# Patient Record
Sex: FEMALE | Race: WHITE
[De-identification: names, ages, dates, MRNs, and addresses within clinical notes are randomized per-mention and may not be internally consistent; named-entity substitution may affect disease eponyms.]

---

## 2019-08-15 ENCOUNTER — HOSPITAL ENCOUNTER (OUTPATIENT)
Age: 79
End: 2019-08-15
Payer: MEDICARE

## 2019-08-15 DIAGNOSIS — M06.4: Primary | ICD-10-CM

## 2019-08-15 DIAGNOSIS — R76.8: ICD-10-CM

## 2019-08-15 DIAGNOSIS — M21.40: ICD-10-CM

## 2019-08-15 DIAGNOSIS — R51: ICD-10-CM

## 2019-08-15 DIAGNOSIS — E11.9: ICD-10-CM

## 2019-08-15 DIAGNOSIS — Z90.5: ICD-10-CM

## 2019-08-15 DIAGNOSIS — M17.0: ICD-10-CM

## 2019-08-15 DIAGNOSIS — I10: ICD-10-CM

## 2019-08-15 DIAGNOSIS — E78.5: ICD-10-CM

## 2019-08-15 LAB
ALANINE AMINOTRANSFER ALT/SGPT: 30 U/L (ref 13–56)
ALBUMIN SERPL-MCNC: 3.6 G/DL (ref 3.2–5)
ALKALINE PHOSPHATASE: 81 U/L (ref 45–117)
ANION GAP: 9 (ref 5–15)
AST(SGOT): 14 U/L (ref 15–37)
BUN SERPL-MCNC: 28 MG/DL (ref 7–18)
BUN/CREAT RATIO: 27.5 RATIO (ref 10–20)
CALCIUM SERPL-MCNC: 9.3 MG/DL (ref 8.5–10.1)
CARBON DIOXIDE: 30 MMOL/L (ref 21–32)
CHLORIDE: 102 MMOL/L (ref 98–107)
CREAT UR-MCNC: 165 MG/DL
DEPRECATED RDW RBC: 47 FL (ref 35.1–43.9)
ERYTHROCYTE [DISTWIDTH] IN BLOOD: 13.5 % (ref 11.6–14.6)
EST GLOM FILT RATE - AFR AMER: 68 ML/MIN (ref 60–?)
EXAGEN: (no result)
GLOBULIN: 3.7 G/DL (ref 2.2–4.2)
GLUCOSE: 139 MG/DL (ref 74–106)
HCT VFR BLD AUTO: 44.4 % (ref 37–47)
HEMOGLOBIN: 14.2 G/DL (ref 12–15)
HGB BLD-MCNC: 14.2 G/DL (ref 12–15)
IMMATURE GRANULOCYTES COUNT: 0.05 X10^3/UL (ref 0–0)
KETONE-DIPSTICK: 5 MG/DL
LEUKOCYTE ESTERASE UR QL STRIP: 25 /UL
MCV RBC: 94.7 FL (ref 81–99)
MEAN CORP HGB CONC: 32 G/DL (ref 32–36)
MEAN PLATELET VOL.: 10.4 FL (ref 6.2–12)
NRBC FLAGGED BY ANALYZER: 0 % (ref 0–5)
PLATELET # BLD: 280 K/MM3 (ref 150–450)
PLATELET COUNT: 280 K/MM3 (ref 150–450)
POTASSIUM: 3.8 MMOL/L (ref 3.5–5.1)
PROT UR QL STRIP.AUTO: 15 MG/DL
PROT UR-MCNC: 22.5 MG/DL (ref ?–11.9)
PROT/CREAT UR: 136 MG/G CRE (ref 0–200)
RBC # BLD AUTO: 4.69 M/MM3 (ref 4.2–5.4)
RBC DISTRIBUTION WIDTH CV: 13.5 % (ref 11.6–14.6)
RBC DISTRIBUTION WIDTH SD: 47 FL (ref 35.1–43.9)
SP GR UR: 1.02 (ref 1–1.03)
URINE PRESERVATIVE: (no result)
WBC # BLD AUTO: 12.5 K/MM3 (ref 4.4–11)
WHITE BLOOD COUNT: 12.5 K/MM3 (ref 4.4–11)

## 2019-08-15 PROCEDURE — 82570 ASSAY OF URINE CREATININE: CPT

## 2019-08-15 PROCEDURE — 84156 ASSAY OF PROTEIN URINE: CPT

## 2019-08-15 PROCEDURE — 87340 HEPATITIS B SURFACE AG IA: CPT

## 2019-08-15 PROCEDURE — 81002 URINALYSIS NONAUTO W/O SCOPE: CPT

## 2019-08-15 PROCEDURE — 86706 HEP B SURFACE ANTIBODY: CPT

## 2019-08-15 PROCEDURE — 86705 HEP B CORE ANTIBODY IGM: CPT

## 2019-08-15 PROCEDURE — 85025 COMPLETE CBC W/AUTO DIFF WBC: CPT

## 2019-08-15 PROCEDURE — 36415 COLL VENOUS BLD VENIPUNCTURE: CPT

## 2019-08-15 PROCEDURE — 86803 HEPATITIS C AB TEST: CPT

## 2019-08-15 PROCEDURE — 80053 COMPREHEN METABOLIC PANEL: CPT

## 2023-02-24 PROBLEM — E78.5 HYPERLIPIDEMIA: Status: ACTIVE | Noted: 2023-02-24

## 2023-02-24 PROBLEM — R39.15 URINARY URGENCY: Status: ACTIVE | Noted: 2023-02-24

## 2023-02-24 PROBLEM — I65.23 ATHEROSCLEROSIS OF BOTH CAROTID ARTERIES: Status: ACTIVE | Noted: 2023-02-24

## 2023-02-24 PROBLEM — E66.3 OVERWEIGHT WITH BODY MASS INDEX (BMI) OF 27 TO 27.9 IN ADULT: Status: ACTIVE | Noted: 2023-02-24

## 2023-02-24 PROBLEM — E66.8 EXTREME OBESITY: Status: ACTIVE | Noted: 2023-02-24

## 2023-02-24 PROBLEM — M77.8 TENDINITIS OF RIGHT SHOULDER: Status: ACTIVE | Noted: 2023-02-24

## 2023-02-24 PROBLEM — R35.0 URINARY FREQUENCY: Status: ACTIVE | Noted: 2023-02-24

## 2023-02-24 PROBLEM — R94.39 ABNORMAL STRESS TEST: Status: ACTIVE | Noted: 2023-02-24

## 2023-02-24 PROBLEM — I10 HYPERTENSION: Status: ACTIVE | Noted: 2023-02-24

## 2023-02-24 PROBLEM — L03.012 CELLULITIS OF LEFT RING FINGER: Status: ACTIVE | Noted: 2023-02-24

## 2023-02-24 PROBLEM — M10.9 GOUT: Status: ACTIVE | Noted: 2023-02-24

## 2023-02-24 PROBLEM — N30.90 BLADDER INFECTION: Status: ACTIVE | Noted: 2023-02-24

## 2023-02-24 PROBLEM — R53.83 FATIGUE: Status: ACTIVE | Noted: 2023-02-24

## 2023-02-24 PROBLEM — I25.810 CORONARY ARTERY DISEASE INVOLVING AUTOLOGOUS ARTERY CORONARY BYPASS GRAFT WITHOUT ANGINA PECTORIS: Status: ACTIVE | Noted: 2023-02-24

## 2023-02-24 PROBLEM — E53.8 VITAMIN B12 DEFICIENCY: Status: ACTIVE | Noted: 2023-02-24

## 2023-02-24 PROBLEM — F32.A DEPRESSION: Status: ACTIVE | Noted: 2023-02-24

## 2023-02-24 PROBLEM — E66.9 EXTREME OBESITY: Status: ACTIVE | Noted: 2023-02-24

## 2023-02-24 PROBLEM — M06.9 RHEUMATOID ARTHRITIS (MULTI): Status: ACTIVE | Noted: 2023-02-24

## 2023-02-24 PROBLEM — M17.11 OSTEOARTHRITIS OF RIGHT KNEE: Status: ACTIVE | Noted: 2023-02-24

## 2023-02-24 PROBLEM — R23.8 SKIN BREAKDOWN: Status: ACTIVE | Noted: 2023-02-24

## 2023-02-24 PROBLEM — R07.9 CHEST PAIN: Status: ACTIVE | Noted: 2023-02-24

## 2023-02-24 PROBLEM — M25.511 RIGHT SHOULDER PAIN: Status: ACTIVE | Noted: 2023-02-24

## 2023-02-24 PROBLEM — N39.41 URGE INCONTINENCE OF URINE: Status: ACTIVE | Noted: 2023-02-24

## 2023-02-24 PROBLEM — M54.50 LOW BACK PAIN: Status: ACTIVE | Noted: 2023-02-24

## 2023-02-24 PROBLEM — E11.9 DIABETES MELLITUS (MULTI): Status: ACTIVE | Noted: 2023-02-24

## 2023-02-24 PROBLEM — M06.9 ARTHRITIS OR POLYARTHRITIS, RHEUMATOID (MULTI): Status: ACTIVE | Noted: 2023-02-24

## 2023-02-24 PROBLEM — R60.0 BILATERAL LEG EDEMA: Status: ACTIVE | Noted: 2023-02-24

## 2023-02-24 RX ORDER — FOLIC ACID 1 MG/1
1 TABLET ORAL 2 TIMES DAILY
COMMUNITY
End: 2023-04-24 | Stop reason: ALTCHOICE

## 2023-02-24 RX ORDER — METFORMIN HYDROCHLORIDE 750 MG/1
2 TABLET, EXTENDED RELEASE ORAL DAILY
COMMUNITY
End: 2023-11-07 | Stop reason: SDUPTHER

## 2023-02-24 RX ORDER — BLOOD SUGAR DIAGNOSTIC
STRIP MISCELLANEOUS
COMMUNITY
Start: 2020-05-22

## 2023-02-24 RX ORDER — MIRABEGRON 25 MG/1
1 TABLET, FILM COATED, EXTENDED RELEASE ORAL DAILY
COMMUNITY
Start: 2022-10-06 | End: 2023-11-07 | Stop reason: SDUPTHER

## 2023-02-24 RX ORDER — LISINOPRIL 40 MG/1
1 TABLET ORAL DAILY
COMMUNITY
End: 2023-11-07 | Stop reason: SDUPTHER

## 2023-02-24 RX ORDER — SERTRALINE HYDROCHLORIDE 50 MG/1
TABLET, FILM COATED ORAL
COMMUNITY
Start: 2022-11-03 | End: 2023-05-25 | Stop reason: SDUPTHER

## 2023-02-24 RX ORDER — LANCETS
EACH MISCELLANEOUS
COMMUNITY

## 2023-02-24 RX ORDER — ATORVASTATIN CALCIUM 20 MG/1
1 TABLET, FILM COATED ORAL DAILY
COMMUNITY
End: 2023-11-07 | Stop reason: SDUPTHER

## 2023-02-24 RX ORDER — FUROSEMIDE 40 MG/1
2 TABLET ORAL DAILY
COMMUNITY
End: 2023-11-07 | Stop reason: SDUPTHER

## 2023-02-24 RX ORDER — LABETALOL 200 MG/1
1 TABLET, FILM COATED ORAL 2 TIMES DAILY
COMMUNITY
End: 2023-11-07 | Stop reason: SDUPTHER

## 2023-03-21 ENCOUNTER — CLINICAL SUPPORT (OUTPATIENT)
Dept: PRIMARY CARE | Facility: CLINIC | Age: 83
End: 2023-03-21
Payer: MEDICARE

## 2023-03-21 DIAGNOSIS — E53.8 VITAMIN B12 DEFICIENCY: ICD-10-CM

## 2023-03-21 PROCEDURE — 96372 THER/PROPH/DIAG INJ SC/IM: CPT | Performed by: FAMILY MEDICINE

## 2023-03-21 RX ORDER — CYANOCOBALAMIN 1000 UG/ML
1000 INJECTION, SOLUTION INTRAMUSCULAR; SUBCUTANEOUS ONCE
Status: COMPLETED | OUTPATIENT
Start: 2023-03-21 | End: 2023-03-21

## 2023-03-21 RX ADMIN — CYANOCOBALAMIN 1000 MCG: 1000 INJECTION, SOLUTION INTRAMUSCULAR; SUBCUTANEOUS at 15:07

## 2023-04-17 LAB
ALANINE AMINOTRANSFERASE (SGPT) (U/L) IN SER/PLAS: 12 U/L (ref 7–45)
ALBUMIN (G/DL) IN SER/PLAS: 4.1 G/DL (ref 3.4–5)
ALKALINE PHOSPHATASE (U/L) IN SER/PLAS: 95 U/L (ref 33–136)
ANION GAP IN SER/PLAS: 11 MMOL/L (ref 10–20)
ASPARTATE AMINOTRANSFERASE (SGOT) (U/L) IN SER/PLAS: 17 U/L (ref 9–39)
BASOPHILS (10*3/UL) IN BLOOD BY AUTOMATED COUNT: 0.05 X10E9/L (ref 0–0.1)
BASOPHILS/100 LEUKOCYTES IN BLOOD BY AUTOMATED COUNT: 0.6 % (ref 0–2)
BILIRUBIN TOTAL (MG/DL) IN SER/PLAS: 0.5 MG/DL (ref 0–1.2)
CALCIUM (MG/DL) IN SER/PLAS: 9.7 MG/DL (ref 8.6–10.3)
CARBON DIOXIDE, TOTAL (MMOL/L) IN SER/PLAS: 30 MMOL/L (ref 21–32)
CHLORIDE (MMOL/L) IN SER/PLAS: 105 MMOL/L (ref 98–107)
CHOLESTEROL (MG/DL) IN SER/PLAS: 149 MG/DL (ref 0–199)
CHOLESTEROL IN HDL (MG/DL) IN SER/PLAS: 46 MG/DL
CHOLESTEROL/HDL RATIO: 3.2
COBALAMIN (VITAMIN B12) (PG/ML) IN SER/PLAS: 400 PG/ML (ref 211–911)
CREATININE (MG/DL) IN SER/PLAS: 0.88 MG/DL (ref 0.5–1.05)
EOSINOPHILS (10*3/UL) IN BLOOD BY AUTOMATED COUNT: 0.27 X10E9/L (ref 0–0.4)
EOSINOPHILS/100 LEUKOCYTES IN BLOOD BY AUTOMATED COUNT: 3.5 % (ref 0–6)
ERYTHROCYTE DISTRIBUTION WIDTH (RATIO) BY AUTOMATED COUNT: 13.2 % (ref 11.5–14.5)
ERYTHROCYTE MEAN CORPUSCULAR HEMOGLOBIN CONCENTRATION (G/DL) BY AUTOMATED: 31.2 G/DL (ref 32–36)
ERYTHROCYTE MEAN CORPUSCULAR VOLUME (FL) BY AUTOMATED COUNT: 93 FL (ref 80–100)
ERYTHROCYTES (10*6/UL) IN BLOOD BY AUTOMATED COUNT: 4.27 X10E12/L (ref 4–5.2)
ESTIMATED AVERAGE GLUCOSE FOR HBA1C: 134 MG/DL
GFR FEMALE: 66 ML/MIN/1.73M2
GLUCOSE (MG/DL) IN SER/PLAS: 102 MG/DL (ref 74–99)
HEMATOCRIT (%) IN BLOOD BY AUTOMATED COUNT: 39.7 % (ref 36–46)
HEMOGLOBIN (G/DL) IN BLOOD: 12.4 G/DL (ref 12–16)
HEMOGLOBIN A1C/HEMOGLOBIN TOTAL IN BLOOD: 6.3 %
IMMATURE GRANULOCYTES/100 LEUKOCYTES IN BLOOD BY AUTOMATED COUNT: 0.3 % (ref 0–0.9)
LDL: 83 MG/DL (ref 0–99)
LEUKOCYTES (10*3/UL) IN BLOOD BY AUTOMATED COUNT: 7.7 X10E9/L (ref 4.4–11.3)
LYMPHOCYTES (10*3/UL) IN BLOOD BY AUTOMATED COUNT: 1.35 X10E9/L (ref 0.8–3)
LYMPHOCYTES/100 LEUKOCYTES IN BLOOD BY AUTOMATED COUNT: 17.5 % (ref 13–44)
MONOCYTES (10*3/UL) IN BLOOD BY AUTOMATED COUNT: 0.32 X10E9/L (ref 0.05–0.8)
MONOCYTES/100 LEUKOCYTES IN BLOOD BY AUTOMATED COUNT: 4.2 % (ref 2–10)
NEUTROPHILS (10*3/UL) IN BLOOD BY AUTOMATED COUNT: 5.7 X10E9/L (ref 1.6–5.5)
NEUTROPHILS/100 LEUKOCYTES IN BLOOD BY AUTOMATED COUNT: 73.9 % (ref 40–80)
PLATELETS (10*3/UL) IN BLOOD AUTOMATED COUNT: 290 X10E9/L (ref 150–450)
POTASSIUM (MMOL/L) IN SER/PLAS: 4.2 MMOL/L (ref 3.5–5.3)
PROTEIN TOTAL: 6.8 G/DL (ref 6.4–8.2)
SODIUM (MMOL/L) IN SER/PLAS: 142 MMOL/L (ref 136–145)
TRIGLYCERIDE (MG/DL) IN SER/PLAS: 101 MG/DL (ref 0–149)
UREA NITROGEN (MG/DL) IN SER/PLAS: 23 MG/DL (ref 6–23)
VLDL: 20 MG/DL (ref 0–40)

## 2023-04-24 ENCOUNTER — OFFICE VISIT (OUTPATIENT)
Dept: PRIMARY CARE | Facility: CLINIC | Age: 83
End: 2023-04-24
Payer: MEDICARE

## 2023-04-24 ENCOUNTER — TELEPHONE (OUTPATIENT)
Dept: PRIMARY CARE | Facility: CLINIC | Age: 83
End: 2023-04-24

## 2023-04-24 VITALS
OXYGEN SATURATION: 98 % | HEIGHT: 62 IN | BODY MASS INDEX: 26.59 KG/M2 | WEIGHT: 144.5 LBS | SYSTOLIC BLOOD PRESSURE: 106 MMHG | DIASTOLIC BLOOD PRESSURE: 64 MMHG | HEART RATE: 60 BPM

## 2023-04-24 DIAGNOSIS — I25.810 CORONARY ARTERY DISEASE INVOLVING AUTOLOGOUS ARTERY CORONARY BYPASS GRAFT WITHOUT ANGINA PECTORIS: Primary | ICD-10-CM

## 2023-04-24 DIAGNOSIS — M06.9 RHEUMATOID ARTHRITIS, INVOLVING UNSPECIFIED SITE, UNSPECIFIED WHETHER RHEUMATOID FACTOR PRESENT (MULTI): ICD-10-CM

## 2023-04-24 DIAGNOSIS — E11.9 TYPE 2 DIABETES MELLITUS WITHOUT COMPLICATION, WITHOUT LONG-TERM CURRENT USE OF INSULIN (MULTI): ICD-10-CM

## 2023-04-24 DIAGNOSIS — R00.2 PALPITATIONS: ICD-10-CM

## 2023-04-24 DIAGNOSIS — E53.8 VITAMIN B12 DEFICIENCY: ICD-10-CM

## 2023-04-24 DIAGNOSIS — I10 PRIMARY HYPERTENSION: ICD-10-CM

## 2023-04-24 DIAGNOSIS — E78.2 MIXED HYPERLIPIDEMIA: ICD-10-CM

## 2023-04-24 DIAGNOSIS — F33.0 MILD EPISODE OF RECURRENT MAJOR DEPRESSIVE DISORDER (CMS-HCC): ICD-10-CM

## 2023-04-24 PROBLEM — E66.8 EXTREME OBESITY: Status: RESOLVED | Noted: 2023-02-24 | Resolved: 2023-04-24

## 2023-04-24 PROBLEM — E66.3 OVERWEIGHT WITH BODY MASS INDEX (BMI) OF 27 TO 27.9 IN ADULT: Status: RESOLVED | Noted: 2023-02-24 | Resolved: 2023-04-24

## 2023-04-24 PROBLEM — R07.9 CHEST PAIN: Status: RESOLVED | Noted: 2023-02-24 | Resolved: 2023-04-24

## 2023-04-24 PROBLEM — R39.15 URINARY URGENCY: Status: RESOLVED | Noted: 2023-02-24 | Resolved: 2023-04-24

## 2023-04-24 PROBLEM — E66.9 EXTREME OBESITY: Status: RESOLVED | Noted: 2023-02-24 | Resolved: 2023-04-24

## 2023-04-24 PROBLEM — R35.0 URINARY FREQUENCY: Status: RESOLVED | Noted: 2023-02-24 | Resolved: 2023-04-24

## 2023-04-24 PROCEDURE — 96372 THER/PROPH/DIAG INJ SC/IM: CPT | Performed by: FAMILY MEDICINE

## 2023-04-24 PROCEDURE — 1157F ADVNC CARE PLAN IN RCRD: CPT | Performed by: FAMILY MEDICINE

## 2023-04-24 PROCEDURE — 3074F SYST BP LT 130 MM HG: CPT | Performed by: FAMILY MEDICINE

## 2023-04-24 PROCEDURE — 1160F RVW MEDS BY RX/DR IN RCRD: CPT | Performed by: FAMILY MEDICINE

## 2023-04-24 PROCEDURE — 1036F TOBACCO NON-USER: CPT | Performed by: FAMILY MEDICINE

## 2023-04-24 PROCEDURE — 99214 OFFICE O/P EST MOD 30 MIN: CPT | Performed by: FAMILY MEDICINE

## 2023-04-24 PROCEDURE — 1159F MED LIST DOCD IN RCRD: CPT | Performed by: FAMILY MEDICINE

## 2023-04-24 PROCEDURE — 3078F DIAST BP <80 MM HG: CPT | Performed by: FAMILY MEDICINE

## 2023-04-24 RX ORDER — CYANOCOBALAMIN 1000 UG/ML
1000 INJECTION, SOLUTION INTRAMUSCULAR; SUBCUTANEOUS ONCE
Status: COMPLETED | OUTPATIENT
Start: 2023-04-24 | End: 2023-04-24

## 2023-04-24 RX ADMIN — CYANOCOBALAMIN 1000 MCG: 1000 INJECTION, SOLUTION INTRAMUSCULAR; SUBCUTANEOUS at 16:01

## 2023-04-24 ASSESSMENT — ENCOUNTER SYMPTOMS
ACTIVITY CHANGE: 0
WHEEZING: 0
RHINORRHEA: 0
SHORTNESS OF BREATH: 0
ABDOMINAL PAIN: 0
NUMBNESS: 0
ABDOMINAL DISTENTION: 0
PALPITATIONS: 1
DIFFICULTY URINATING: 0
DIZZINESS: 0
COUGH: 0
HEADACHES: 0
CONSTIPATION: 0
ARTHRALGIAS: 0
LIGHT-HEADEDNESS: 0
TROUBLE SWALLOWING: 0
FATIGUE: 0
NAUSEA: 0
DIARRHEA: 0
APPETITE CHANGE: 0
UNEXPECTED WEIGHT CHANGE: 0
NERVOUS/ANXIOUS: 1
ADENOPATHY: 0
VOMITING: 0

## 2023-04-24 NOTE — ASSESSMENT & PLAN NOTE
Seen cardiologist in February, no change, blood pressure seems to be stable today.  Patient is having some intermittent chest pressure and palpitations at times, will check a 7-day event monitor.

## 2023-04-24 NOTE — ASSESSMENT & PLAN NOTE
Blood pressure in the office today is good, some home blood pressures are elevated, renal function is improved, no change.

## 2023-04-24 NOTE — PROGRESS NOTES
"Subjective   Patient ID: Alma Delia Walsh is a 82 y.o. female who presents for 3 MO F/U HTN REV LABS (B12 INJ).    HPI   No headache, chest pain, shortness of breath, dizziness, lightheadedness, or edema  No low blood sugars since last OV, seen opthalmology in the past year, and no numbness or tingling in feet, skin normal.  Some chest pressure at times, seen cardiology in February  Some pain in right arm, no HHC at home, family helps  Stomach and bowel issues some better  Medicine helps bladder issues    Review of Systems   Constitutional:  Negative for activity change, appetite change, fatigue and unexpected weight change.   HENT:  Negative for ear pain, nosebleeds, rhinorrhea, sneezing and trouble swallowing.    Respiratory:  Negative for cough, shortness of breath and wheezing.    Cardiovascular:  Positive for chest pain and palpitations. Negative for leg swelling.   Gastrointestinal:  Negative for abdominal distention, abdominal pain, constipation, diarrhea, nausea and vomiting.   Genitourinary:  Negative for difficulty urinating.   Musculoskeletal:  Negative for arthralgias.   Skin:  Negative for rash.   Neurological:  Negative for dizziness, light-headedness, numbness and headaches.   Hematological:  Negative for adenopathy.   Psychiatric/Behavioral:  Negative for behavioral problems. The patient is nervous/anxious.    All other systems reviewed and are negative.      Objective   /64   Pulse 60   Ht 1.575 m (5' 2\")   Wt 65.5 kg (144 lb 8 oz)   SpO2 98%   BMI 26.43 kg/m²     Physical Exam  Vitals and nursing note reviewed.   Constitutional:       Appearance: Normal appearance.   Cardiovascular:      Rate and Rhythm: Normal rate and regular rhythm.      Pulses: Normal pulses.      Heart sounds: Normal heart sounds.   Pulmonary:      Effort: Pulmonary effort is normal.      Breath sounds: Normal breath sounds.   Neurological:      Mental Status: She is alert.   Psychiatric:         Mood and Affect: " Mood normal.         Behavior: Behavior normal.         Assessment/Plan   Problem List Items Addressed This Visit          Circulatory    Coronary artery disease involving autologous artery coronary bypass graft without angina pectoris - Primary     Seen cardiologist in February, no change, blood pressure seems to be stable today.  Patient is having some intermittent chest pressure and palpitations at times, will check a 7-day event monitor.         Relevant Orders    Comprehensive Metabolic Panel    Holter or Event Cardiac Monitor    Follow Up In Primary Care    Hypertension     Blood pressure in the office today is good, some home blood pressures are elevated, renal function is improved, no change.         Relevant Orders    Comprehensive Metabolic Panel    Follow Up In Primary Care    Palpitations     Patient complaining of some intermittent palpitations over the past month, some worse at night, just checking 7-day event monitor to rule out atrial fibrillation.         Relevant Orders    Holter or Event Cardiac Monitor    Follow Up In Primary Care       Endocrine/Metabolic    Diabetes mellitus (CMS/HCC)     A1c testing now below 6.5, tolerating release metformin with less diarrhea, advised about diet         Relevant Orders    Comprehensive Metabolic Panel    Hemoglobin A1C    Follow Up In Primary Care    Vitamin B12 deficiency     B12 levels approaching more normal range, continue with monthly injections.         Relevant Orders    CBC    Vitamin B12    Follow Up In Primary Care       Other    Depression     To be stable at this point.  Family is helpful at home.         Relevant Orders    Follow Up In Primary Care    Hyperlipidemia    Relevant Orders    Comprehensive Metabolic Panel    Follow Up In Primary Care    Arthritis or polyarthritis, rheumatoid (CMS/HCC)     To be stable, patient has no follow-up with rheumatology.         Relevant Orders    Follow Up In Primary Care

## 2023-04-24 NOTE — ASSESSMENT & PLAN NOTE
Patient complaining of some intermittent palpitations over the past month, some worse at night, just checking 7-day event monitor to rule out atrial fibrillation.

## 2023-05-11 ENCOUNTER — TELEPHONE (OUTPATIENT)
Dept: PRIMARY CARE | Facility: CLINIC | Age: 83
End: 2023-05-11
Payer: MEDICARE

## 2023-05-25 ENCOUNTER — APPOINTMENT (OUTPATIENT)
Dept: PRIMARY CARE | Facility: CLINIC | Age: 83
End: 2023-05-25
Payer: MEDICARE

## 2023-05-25 ENCOUNTER — OFFICE VISIT (OUTPATIENT)
Dept: PRIMARY CARE | Facility: CLINIC | Age: 83
End: 2023-05-25
Payer: MEDICARE

## 2023-05-25 VITALS
OXYGEN SATURATION: 98 % | BODY MASS INDEX: 26.83 KG/M2 | SYSTOLIC BLOOD PRESSURE: 150 MMHG | HEIGHT: 62 IN | HEART RATE: 51 BPM | WEIGHT: 145.8 LBS | DIASTOLIC BLOOD PRESSURE: 70 MMHG

## 2023-05-25 DIAGNOSIS — F33.0 MILD EPISODE OF RECURRENT MAJOR DEPRESSIVE DISORDER (CMS-HCC): Primary | ICD-10-CM

## 2023-05-25 DIAGNOSIS — E53.8 VITAMIN B12 DEFICIENCY: ICD-10-CM

## 2023-05-25 PROCEDURE — 3078F DIAST BP <80 MM HG: CPT | Performed by: FAMILY MEDICINE

## 2023-05-25 PROCEDURE — 1157F ADVNC CARE PLAN IN RCRD: CPT | Performed by: FAMILY MEDICINE

## 2023-05-25 PROCEDURE — 1036F TOBACCO NON-USER: CPT | Performed by: FAMILY MEDICINE

## 2023-05-25 PROCEDURE — 3077F SYST BP >= 140 MM HG: CPT | Performed by: FAMILY MEDICINE

## 2023-05-25 PROCEDURE — 1160F RVW MEDS BY RX/DR IN RCRD: CPT | Performed by: FAMILY MEDICINE

## 2023-05-25 PROCEDURE — 96372 THER/PROPH/DIAG INJ SC/IM: CPT | Performed by: FAMILY MEDICINE

## 2023-05-25 PROCEDURE — 1159F MED LIST DOCD IN RCRD: CPT | Performed by: FAMILY MEDICINE

## 2023-05-25 PROCEDURE — 99213 OFFICE O/P EST LOW 20 MIN: CPT | Performed by: FAMILY MEDICINE

## 2023-05-25 RX ORDER — CYANOCOBALAMIN 1000 UG/ML
1000 INJECTION, SOLUTION INTRAMUSCULAR; SUBCUTANEOUS ONCE
Status: COMPLETED | OUTPATIENT
Start: 2023-05-25 | End: 2023-05-25

## 2023-05-25 RX ORDER — SERTRALINE HYDROCHLORIDE 100 MG/1
100 TABLET, FILM COATED ORAL DAILY
Qty: 30 TABLET | Refills: 11 | Status: SHIPPED | OUTPATIENT
Start: 2023-05-25 | End: 2023-08-07 | Stop reason: SDUPTHER

## 2023-05-25 RX ADMIN — CYANOCOBALAMIN 1000 MCG: 1000 INJECTION, SOLUTION INTRAMUSCULAR; SUBCUTANEOUS at 15:03

## 2023-05-25 ASSESSMENT — ENCOUNTER SYMPTOMS
APPETITE CHANGE: 1
CONSTIPATION: 0
SHORTNESS OF BREATH: 0
NAUSEA: 0
DYSPHORIC MOOD: 1
DECREASED CONCENTRATION: 1
ABDOMINAL PAIN: 0
HALLUCINATIONS: 0
NERVOUS/ANXIOUS: 1
VOMITING: 0
SLEEP DISTURBANCE: 1
FATIGUE: 1
DIARRHEA: 0
PALPITATIONS: 0
AGITATION: 0

## 2023-05-25 NOTE — PROGRESS NOTES
"Subjective   Patient ID: Alma Delia Walsh is a 82 y.o. female who presents for Anxiety.    HPI   Easily tearful, seen cardiology (negative evaluation, concerned about anxiety attacks)  Worry a lot, waking up at night with pressure in chest/heavy feeling, appetite +/- lost 5 pounds in the past 6 months, no toilet issues, hard to relax at times, no SI/HI  Some trouble with focus and concentration, easily irritable and angry      Review of Systems   Constitutional:  Positive for appetite change and fatigue.   Respiratory:  Negative for shortness of breath.    Cardiovascular:  Negative for chest pain, palpitations and leg swelling.   Gastrointestinal:  Negative for abdominal pain, constipation, diarrhea, nausea and vomiting.   Psychiatric/Behavioral:  Positive for behavioral problems, decreased concentration, dysphoric mood and sleep disturbance. Negative for agitation, hallucinations and suicidal ideas. The patient is nervous/anxious.        Objective   /70   Pulse 51   Ht 1.575 m (5' 2\")   Wt 66.1 kg (145 lb 12.8 oz)   SpO2 98%   BMI 26.67 kg/m²     Physical Exam  Vitals and nursing note reviewed.   Constitutional:       Appearance: Normal appearance.   Cardiovascular:      Rate and Rhythm: Normal rate and regular rhythm.      Pulses: Normal pulses.      Heart sounds: Normal heart sounds.   Pulmonary:      Effort: Pulmonary effort is normal.      Breath sounds: Normal breath sounds.   Neurological:      Mental Status: She is alert.   Psychiatric:         Mood and Affect: Mood normal.         Behavior: Behavior normal.         Assessment/Plan   Problem List Items Addressed This Visit          Endocrine/Metabolic    Vitamin B12 deficiency    Relevant Medications    cyanocobalamin (Vitamin B-12) injection 1,000 mcg (Completed) (Start on 5/25/2023  3:15 PM)       Other    Depression - Primary    Relevant Medications    sertraline (Zoloft) 100 mg tablet    Other Relevant Orders    Follow Up In Primary Care        "

## 2023-05-25 NOTE — ASSESSMENT & PLAN NOTE
Patient with some component of anxiety with her depression, try increasing sertraline to 100 mg a day, this medicine did help when we first started it several months ago.  If no help consider adding mirtazapine to the patient's regimen, will recheck again in 1 month.  Patient may also want to consider short-term counseling.

## 2023-06-22 ENCOUNTER — CLINICAL SUPPORT (OUTPATIENT)
Dept: PRIMARY CARE | Facility: CLINIC | Age: 83
End: 2023-06-22
Payer: MEDICARE

## 2023-06-22 DIAGNOSIS — E53.8 VITAMIN B12 DEFICIENCY: ICD-10-CM

## 2023-06-22 PROCEDURE — 96372 THER/PROPH/DIAG INJ SC/IM: CPT | Performed by: NURSE PRACTITIONER

## 2023-06-22 RX ORDER — CYANOCOBALAMIN 1000 UG/ML
1000 INJECTION, SOLUTION INTRAMUSCULAR; SUBCUTANEOUS ONCE
Status: COMPLETED | OUTPATIENT
Start: 2023-06-22 | End: 2023-06-22

## 2023-06-22 RX ADMIN — CYANOCOBALAMIN 1000 MCG: 1000 INJECTION, SOLUTION INTRAMUSCULAR; SUBCUTANEOUS at 15:00

## 2023-07-10 ENCOUNTER — APPOINTMENT (OUTPATIENT)
Dept: PRIMARY CARE | Facility: CLINIC | Age: 83
End: 2023-07-10
Payer: MEDICARE

## 2023-07-17 ENCOUNTER — APPOINTMENT (OUTPATIENT)
Dept: PRIMARY CARE | Facility: CLINIC | Age: 83
End: 2023-07-17
Payer: MEDICARE

## 2023-07-24 ENCOUNTER — APPOINTMENT (OUTPATIENT)
Dept: PRIMARY CARE | Facility: CLINIC | Age: 83
End: 2023-07-24
Payer: MEDICARE

## 2023-07-31 ENCOUNTER — LAB (OUTPATIENT)
Dept: LAB | Facility: LAB | Age: 83
End: 2023-07-31
Payer: MEDICARE

## 2023-07-31 DIAGNOSIS — E53.8 VITAMIN B12 DEFICIENCY: ICD-10-CM

## 2023-07-31 DIAGNOSIS — I25.810 CORONARY ARTERY DISEASE INVOLVING AUTOLOGOUS ARTERY CORONARY BYPASS GRAFT WITHOUT ANGINA PECTORIS: ICD-10-CM

## 2023-07-31 DIAGNOSIS — I10 PRIMARY HYPERTENSION: ICD-10-CM

## 2023-07-31 DIAGNOSIS — E78.2 MIXED HYPERLIPIDEMIA: ICD-10-CM

## 2023-07-31 DIAGNOSIS — E11.9 TYPE 2 DIABETES MELLITUS WITHOUT COMPLICATION, WITHOUT LONG-TERM CURRENT USE OF INSULIN (MULTI): ICD-10-CM

## 2023-07-31 LAB
ALANINE AMINOTRANSFERASE (SGPT) (U/L) IN SER/PLAS: 15 U/L (ref 7–45)
ALBUMIN (G/DL) IN SER/PLAS: 4.1 G/DL (ref 3.4–5)
ALKALINE PHOSPHATASE (U/L) IN SER/PLAS: 89 U/L (ref 33–136)
ANION GAP IN SER/PLAS: 10 MMOL/L (ref 10–20)
ASPARTATE AMINOTRANSFERASE (SGOT) (U/L) IN SER/PLAS: 18 U/L (ref 9–39)
BILIRUBIN TOTAL (MG/DL) IN SER/PLAS: 0.5 MG/DL (ref 0–1.2)
CALCIUM (MG/DL) IN SER/PLAS: 9.6 MG/DL (ref 8.6–10.3)
CARBON DIOXIDE, TOTAL (MMOL/L) IN SER/PLAS: 32 MMOL/L (ref 21–32)
CHLORIDE (MMOL/L) IN SER/PLAS: 104 MMOL/L (ref 98–107)
COBALAMIN (VITAMIN B12) (PG/ML) IN SER/PLAS: 520 PG/ML (ref 211–911)
CREATININE (MG/DL) IN SER/PLAS: 0.91 MG/DL (ref 0.5–1.05)
ERYTHROCYTE DISTRIBUTION WIDTH (RATIO) BY AUTOMATED COUNT: 13.5 % (ref 11.5–14.5)
ERYTHROCYTE MEAN CORPUSCULAR HEMOGLOBIN CONCENTRATION (G/DL) BY AUTOMATED: 31.1 G/DL (ref 32–36)
ERYTHROCYTE MEAN CORPUSCULAR VOLUME (FL) BY AUTOMATED COUNT: 96 FL (ref 80–100)
ERYTHROCYTES (10*6/UL) IN BLOOD BY AUTOMATED COUNT: 4.01 X10E12/L (ref 4–5.2)
ESTIMATED AVERAGE GLUCOSE FOR HBA1C: 131 MG/DL
GFR FEMALE: 63 ML/MIN/1.73M2
GLUCOSE (MG/DL) IN SER/PLAS: 95 MG/DL (ref 74–99)
HEMATOCRIT (%) IN BLOOD BY AUTOMATED COUNT: 38.3 % (ref 36–46)
HEMOGLOBIN (G/DL) IN BLOOD: 11.9 G/DL (ref 12–16)
HEMOGLOBIN A1C/HEMOGLOBIN TOTAL IN BLOOD: 6.2 %
LEUKOCYTES (10*3/UL) IN BLOOD BY AUTOMATED COUNT: 7.6 X10E9/L (ref 4.4–11.3)
PLATELETS (10*3/UL) IN BLOOD AUTOMATED COUNT: 253 X10E9/L (ref 150–450)
POTASSIUM (MMOL/L) IN SER/PLAS: 4.2 MMOL/L (ref 3.5–5.3)
PROTEIN TOTAL: 6.6 G/DL (ref 6.4–8.2)
SODIUM (MMOL/L) IN SER/PLAS: 142 MMOL/L (ref 136–145)
UREA NITROGEN (MG/DL) IN SER/PLAS: 27 MG/DL (ref 6–23)

## 2023-07-31 PROCEDURE — 36415 COLL VENOUS BLD VENIPUNCTURE: CPT

## 2023-07-31 PROCEDURE — 83036 HEMOGLOBIN GLYCOSYLATED A1C: CPT

## 2023-07-31 PROCEDURE — 85027 COMPLETE CBC AUTOMATED: CPT

## 2023-07-31 PROCEDURE — 80053 COMPREHEN METABOLIC PANEL: CPT

## 2023-07-31 PROCEDURE — 82607 VITAMIN B-12: CPT

## 2023-08-07 ENCOUNTER — APPOINTMENT (OUTPATIENT)
Dept: PRIMARY CARE | Facility: CLINIC | Age: 83
End: 2023-08-07
Payer: MEDICARE

## 2023-08-07 ENCOUNTER — OFFICE VISIT (OUTPATIENT)
Dept: PRIMARY CARE | Facility: CLINIC | Age: 83
End: 2023-08-07
Payer: MEDICARE

## 2023-08-07 VITALS
HEART RATE: 46 BPM | HEIGHT: 62 IN | SYSTOLIC BLOOD PRESSURE: 140 MMHG | OXYGEN SATURATION: 97 % | WEIGHT: 146.1 LBS | BODY MASS INDEX: 26.89 KG/M2 | DIASTOLIC BLOOD PRESSURE: 60 MMHG

## 2023-08-07 DIAGNOSIS — E11.9 TYPE 2 DIABETES MELLITUS WITHOUT COMPLICATION, WITHOUT LONG-TERM CURRENT USE OF INSULIN (MULTI): Primary | ICD-10-CM

## 2023-08-07 DIAGNOSIS — E78.2 MIXED HYPERLIPIDEMIA: ICD-10-CM

## 2023-08-07 DIAGNOSIS — F33.0 MILD EPISODE OF RECURRENT MAJOR DEPRESSIVE DISORDER (CMS-HCC): ICD-10-CM

## 2023-08-07 DIAGNOSIS — I25.810 CORONARY ARTERY DISEASE INVOLVING AUTOLOGOUS ARTERY CORONARY BYPASS GRAFT WITHOUT ANGINA PECTORIS: ICD-10-CM

## 2023-08-07 DIAGNOSIS — M06.9 RHEUMATOID ARTHRITIS, INVOLVING UNSPECIFIED SITE, UNSPECIFIED WHETHER RHEUMATOID FACTOR PRESENT (MULTI): ICD-10-CM

## 2023-08-07 DIAGNOSIS — I10 PRIMARY HYPERTENSION: ICD-10-CM

## 2023-08-07 DIAGNOSIS — R00.2 PALPITATIONS: ICD-10-CM

## 2023-08-07 DIAGNOSIS — E53.8 VITAMIN B12 DEFICIENCY: ICD-10-CM

## 2023-08-07 PROBLEM — L03.012 CELLULITIS OF LEFT RING FINGER: Status: RESOLVED | Noted: 2023-02-24 | Resolved: 2023-08-07

## 2023-08-07 PROBLEM — N30.90 BLADDER INFECTION: Status: RESOLVED | Noted: 2023-02-24 | Resolved: 2023-08-07

## 2023-08-07 PROBLEM — N39.41 URGE INCONTINENCE OF URINE: Status: RESOLVED | Noted: 2023-02-24 | Resolved: 2023-08-07

## 2023-08-07 PROCEDURE — 99213 OFFICE O/P EST LOW 20 MIN: CPT | Performed by: FAMILY MEDICINE

## 2023-08-07 PROCEDURE — 3078F DIAST BP <80 MM HG: CPT | Performed by: FAMILY MEDICINE

## 2023-08-07 PROCEDURE — 1159F MED LIST DOCD IN RCRD: CPT | Performed by: FAMILY MEDICINE

## 2023-08-07 PROCEDURE — 1036F TOBACCO NON-USER: CPT | Performed by: FAMILY MEDICINE

## 2023-08-07 PROCEDURE — 3077F SYST BP >= 140 MM HG: CPT | Performed by: FAMILY MEDICINE

## 2023-08-07 PROCEDURE — 1157F ADVNC CARE PLAN IN RCRD: CPT | Performed by: FAMILY MEDICINE

## 2023-08-07 PROCEDURE — 1160F RVW MEDS BY RX/DR IN RCRD: CPT | Performed by: FAMILY MEDICINE

## 2023-08-07 RX ORDER — MELOXICAM 7.5 MG/1
7.5 TABLET ORAL DAILY
Qty: 30 TABLET | Refills: 11 | Status: SHIPPED | OUTPATIENT
Start: 2023-08-07 | End: 2024-05-21 | Stop reason: WASHOUT

## 2023-08-07 RX ORDER — SERTRALINE HYDROCHLORIDE 100 MG/1
100 TABLET, FILM COATED ORAL DAILY
Qty: 30 TABLET | Refills: 11 | Status: SHIPPED | OUTPATIENT
Start: 2023-08-07 | End: 2024-05-21 | Stop reason: SDUPTHER

## 2023-08-07 ASSESSMENT — ENCOUNTER SYMPTOMS
COUGH: 0
TROUBLE SWALLOWING: 0
HEADACHES: 0
FATIGUE: 1
LIGHT-HEADEDNESS: 0
APPETITE CHANGE: 0
NUMBNESS: 0
CONSTIPATION: 0
DYSPHORIC MOOD: 1
VOMITING: 0
DIARRHEA: 0
RHINORRHEA: 0
UNEXPECTED WEIGHT CHANGE: 0
BACK PAIN: 1
OCCASIONAL FEELINGS OF UNSTEADINESS: 1
ARTHRALGIAS: 0
DEPRESSION: 0
NAUSEA: 0
DIFFICULTY URINATING: 0
NERVOUS/ANXIOUS: 1
ADENOPATHY: 0
DIZZINESS: 0
ABDOMINAL DISTENTION: 0
ABDOMINAL PAIN: 0
WHEEZING: 0
SLEEP DISTURBANCE: 0
ACTIVITY CHANGE: 0
SHORTNESS OF BREATH: 0
LOSS OF SENSATION IN FEET: 0
PALPITATIONS: 0

## 2023-08-07 NOTE — ASSESSMENT & PLAN NOTE
Blood pressures under good control, renal function is currently stable, patient only has 1 kidney.

## 2023-08-07 NOTE — ASSESSMENT & PLAN NOTE
Patient has a difficult time walking, complains of diffuse arthritis.  Cautiously try low-dose meloxicam 7 and half milligrams a day as needed, recommended Tylenol, avoid ibuprofen use.

## 2023-08-07 NOTE — PROGRESS NOTES
"Subjective   Patient ID: Alma Delia Walsh is a 82 y.o. female who presents for Follow-up (3 MO LABS).    HPI   No low blood sugars since last OV, seen opthalmology in the past year, and no numbness or tingling in feet, skin normal.  No headache, chest pain, shortness of breath, dizziness, lightheadedness, or edema  Family helps at home, not driving anymore  Using a cane to get around, pain in right arm and low back (some radiation to left leg), using ibuprofen as needed, uses 2 twice a day, helps   Occ pressure in chest at times, no KAUFFMAN  Occ urine incont, medicine helps    Review of Systems   Constitutional:  Positive for fatigue. Negative for activity change, appetite change and unexpected weight change.   HENT:  Negative for ear pain, nosebleeds, rhinorrhea, sneezing and trouble swallowing.    Respiratory:  Negative for cough, shortness of breath and wheezing.    Cardiovascular:  Negative for chest pain, palpitations and leg swelling.   Gastrointestinal:  Negative for abdominal distention, abdominal pain, constipation, diarrhea, nausea and vomiting.   Genitourinary:  Negative for difficulty urinating.   Musculoskeletal:  Positive for back pain and gait problem. Negative for arthralgias.   Skin:  Negative for rash.   Neurological:  Negative for dizziness, light-headedness, numbness and headaches.   Hematological:  Negative for adenopathy.   Psychiatric/Behavioral:  Positive for dysphoric mood. Negative for behavioral problems and sleep disturbance. The patient is nervous/anxious.    All other systems reviewed and are negative.      Objective   /60   Pulse (!) 46   Ht 1.575 m (5' 2\")   Wt 66.3 kg (146 lb 1.6 oz)   SpO2 97%   BMI 26.72 kg/m²     Physical Exam  Vitals and nursing note reviewed.   Constitutional:       Appearance: Normal appearance.   HENT:      Head: Normocephalic and atraumatic.      Right Ear: Tympanic membrane, ear canal and external ear normal.      Left Ear: Tympanic membrane, ear canal " and external ear normal.      Nose: Nose normal.      Mouth/Throat:      Mouth: Mucous membranes are moist.      Pharynx: Oropharynx is clear.   Cardiovascular:      Rate and Rhythm: Normal rate and regular rhythm.      Pulses: Normal pulses.      Heart sounds: Normal heart sounds.   Pulmonary:      Effort: Pulmonary effort is normal.      Breath sounds: Normal breath sounds.   Musculoskeletal:      Cervical back: Normal range of motion and neck supple.   Neurological:      Mental Status: She is alert.   Psychiatric:         Mood and Affect: Mood normal.         Behavior: Behavior normal.         Assessment/Plan   Problem List Items Addressed This Visit       Coronary artery disease involving autologous artery coronary bypass graft without angina pectoris     Currently asymptomatic, follows with cardiology.         Relevant Orders    Follow Up In Primary Care - Established    Comprehensive Metabolic Panel    Depression     Currently active, continue with current medication, patient does have support at home from family, did offer suggestions about assisted living.         Relevant Medications    sertraline (Zoloft) 100 mg tablet    Other Relevant Orders    Follow Up In Primary Care - Established    Diabetes mellitus (CMS/[facility]) - Primary     A1c testing below 6.5, continue with current treatment plan.         Relevant Orders    Follow Up In Primary Care - Established    Comprehensive Metabolic Panel    Hemoglobin A1C    Hyperlipidemia     Labs are good, continue with current medication.         Relevant Orders    Follow Up In Primary Care - Established    Comprehensive Metabolic Panel    Hypertension     Blood pressures under good control, renal function is currently stable, patient only has 1 kidney.         Relevant Orders    Follow Up In Primary Care - Established    Comprehensive Metabolic Panel    Arthritis or polyarthritis, rheumatoid (CMS/[facility])     Patient has a difficult time walking, complains of diffuse  arthritis.  Cautiously try low-dose meloxicam 7 and half milligrams a day as needed, recommended Tylenol, avoid ibuprofen use.         Relevant Medications    meloxicam (Mobic) 7.5 mg tablet    Other Relevant Orders    Follow Up In Primary Care - Established    Vitamin B12 deficiency     Continue with B12 replacement.         Relevant Orders    Follow Up In Primary Care - Established    Vitamin B12    CBC    Palpitations     Doing fine, has follow-up with cardiology.         Relevant Orders    Follow Up In Primary Care - Established    Comprehensive Metabolic Panel    CBC

## 2023-08-07 NOTE — PATIENT INSTRUCTIONS
Try using meloxicam 7.5 mg a day for joint pain. You can use tylenol along with this up to 3000 mg a day.  Start oral B12 replacement at 1000 mcg a day   Advancement-Rotation Flap Text: The defect edges were debeveled with a #15 scalpel blade.  Given the location of the defect, shape of the defect and the proximity to free margins an advancement-rotation flap was deemed most appropriate.  Using a sterile surgical marker, an appropriate flap was drawn incorporating the defect and placing the expected incisions within the relaxed skin tension lines where possible. The area thus outlined was incised deep to adipose tissue with a #15 scalpel blade.  The skin margins were undermined to an appropriate distance in all directions utilizing iris scissors.

## 2023-08-07 NOTE — ASSESSMENT & PLAN NOTE
Currently active, continue with current medication, patient does have support at home from family, did offer suggestions about assisted living.

## 2023-09-14 ENCOUNTER — TELEPHONE (OUTPATIENT)
Dept: PRIMARY CARE | Facility: CLINIC | Age: 83
End: 2023-09-14
Payer: MEDICARE

## 2023-09-21 ENCOUNTER — TELEPHONE (OUTPATIENT)
Dept: PRIMARY CARE | Facility: CLINIC | Age: 83
End: 2023-09-21
Payer: MEDICARE

## 2023-09-21 NOTE — TELEPHONE ENCOUNTER
Pt asking for letter to be able to keep her cat when she moves into [address]. Pt was tearful when discussing this on the phone.

## 2023-10-30 ENCOUNTER — LAB (OUTPATIENT)
Dept: LAB | Facility: LAB | Age: 83
End: 2023-10-30
Payer: MEDICARE

## 2023-10-30 DIAGNOSIS — I10 PRIMARY HYPERTENSION: ICD-10-CM

## 2023-10-30 DIAGNOSIS — E78.2 MIXED HYPERLIPIDEMIA: ICD-10-CM

## 2023-10-30 DIAGNOSIS — I25.810 CORONARY ARTERY DISEASE INVOLVING AUTOLOGOUS ARTERY CORONARY BYPASS GRAFT WITHOUT ANGINA PECTORIS: ICD-10-CM

## 2023-10-30 DIAGNOSIS — R00.2 PALPITATIONS: ICD-10-CM

## 2023-10-30 DIAGNOSIS — E53.8 VITAMIN B12 DEFICIENCY: ICD-10-CM

## 2023-10-30 DIAGNOSIS — E11.9 TYPE 2 DIABETES MELLITUS WITHOUT COMPLICATION, WITHOUT LONG-TERM CURRENT USE OF INSULIN (MULTI): ICD-10-CM

## 2023-10-30 LAB
ALBUMIN SERPL BCP-MCNC: 4.2 G/DL (ref 3.4–5)
ALP SERPL-CCNC: 84 U/L (ref 33–136)
ALT SERPL W P-5'-P-CCNC: 14 U/L (ref 7–45)
ANION GAP SERPL CALC-SCNC: 10 MMOL/L (ref 10–20)
AST SERPL W P-5'-P-CCNC: 18 U/L (ref 9–39)
BILIRUB SERPL-MCNC: 0.4 MG/DL (ref 0–1.2)
BUN SERPL-MCNC: 27 MG/DL (ref 6–23)
CALCIUM SERPL-MCNC: 9.5 MG/DL (ref 8.6–10.3)
CHLORIDE SERPL-SCNC: 105 MMOL/L (ref 98–107)
CO2 SERPL-SCNC: 33 MMOL/L (ref 21–32)
CREAT SERPL-MCNC: 0.88 MG/DL (ref 0.5–1.05)
ERYTHROCYTE [DISTWIDTH] IN BLOOD BY AUTOMATED COUNT: 12.8 % (ref 11.5–14.5)
EST. AVERAGE GLUCOSE BLD GHB EST-MCNC: 126 MG/DL
GFR SERPL CREATININE-BSD FRML MDRD: 66 ML/MIN/1.73M*2
GLUCOSE SERPL-MCNC: 101 MG/DL (ref 74–99)
HBA1C MFR BLD: 6 %
HCT VFR BLD AUTO: 40.6 % (ref 36–46)
HGB BLD-MCNC: 12.6 G/DL (ref 12–16)
MCH RBC QN AUTO: 29.7 PG (ref 26–34)
MCHC RBC AUTO-ENTMCNC: 31 G/DL (ref 32–36)
MCV RBC AUTO: 96 FL (ref 80–100)
NRBC BLD-RTO: 0 /100 WBCS (ref 0–0)
PLATELET # BLD AUTO: 287 X10*3/UL (ref 150–450)
PMV BLD AUTO: 10.5 FL (ref 7.5–11.5)
POTASSIUM SERPL-SCNC: 4.1 MMOL/L (ref 3.5–5.3)
PROT SERPL-MCNC: 7.1 G/DL (ref 6.4–8.2)
RBC # BLD AUTO: 4.24 X10*6/UL (ref 4–5.2)
SODIUM SERPL-SCNC: 144 MMOL/L (ref 136–145)
VIT B12 SERPL-MCNC: 695 PG/ML (ref 211–911)
WBC # BLD AUTO: 7.5 X10*3/UL (ref 4.4–11.3)

## 2023-10-30 PROCEDURE — 80053 COMPREHEN METABOLIC PANEL: CPT

## 2023-10-30 PROCEDURE — 36415 COLL VENOUS BLD VENIPUNCTURE: CPT

## 2023-10-30 PROCEDURE — 85027 COMPLETE CBC AUTOMATED: CPT

## 2023-10-30 PROCEDURE — 83036 HEMOGLOBIN GLYCOSYLATED A1C: CPT

## 2023-10-30 PROCEDURE — 82607 VITAMIN B-12: CPT

## 2023-11-07 ENCOUNTER — OFFICE VISIT (OUTPATIENT)
Dept: PRIMARY CARE | Facility: CLINIC | Age: 83
End: 2023-11-07
Payer: MEDICARE

## 2023-11-07 ENCOUNTER — TELEPHONE (OUTPATIENT)
Dept: PRIMARY CARE | Facility: CLINIC | Age: 83
End: 2023-11-07

## 2023-11-07 VITALS
DIASTOLIC BLOOD PRESSURE: 70 MMHG | HEIGHT: 62 IN | WEIGHT: 146.4 LBS | BODY MASS INDEX: 26.94 KG/M2 | HEART RATE: 60 BPM | OXYGEN SATURATION: 95 % | SYSTOLIC BLOOD PRESSURE: 150 MMHG

## 2023-11-07 DIAGNOSIS — I25.810 CORONARY ARTERY DISEASE INVOLVING AUTOLOGOUS ARTERY CORONARY BYPASS GRAFT WITHOUT ANGINA PECTORIS: ICD-10-CM

## 2023-11-07 DIAGNOSIS — E53.8 VITAMIN B12 DEFICIENCY: ICD-10-CM

## 2023-11-07 DIAGNOSIS — R00.2 PALPITATIONS: ICD-10-CM

## 2023-11-07 DIAGNOSIS — E11.9 TYPE 2 DIABETES MELLITUS WITHOUT COMPLICATION, WITHOUT LONG-TERM CURRENT USE OF INSULIN (MULTI): ICD-10-CM

## 2023-11-07 DIAGNOSIS — F51.01 PRIMARY INSOMNIA: ICD-10-CM

## 2023-11-07 DIAGNOSIS — M06.9 RHEUMATOID ARTHRITIS, INVOLVING UNSPECIFIED SITE, UNSPECIFIED WHETHER RHEUMATOID FACTOR PRESENT (MULTI): ICD-10-CM

## 2023-11-07 DIAGNOSIS — N39.41 URGE INCONTINENCE OF URINE: ICD-10-CM

## 2023-11-07 DIAGNOSIS — I10 PRIMARY HYPERTENSION: Primary | ICD-10-CM

## 2023-11-07 DIAGNOSIS — E78.2 MIXED HYPERLIPIDEMIA: ICD-10-CM

## 2023-11-07 DIAGNOSIS — F33.0 MILD EPISODE OF RECURRENT MAJOR DEPRESSIVE DISORDER (CMS-HCC): ICD-10-CM

## 2023-11-07 PROBLEM — R94.39 ABNORMAL STRESS TEST: Status: RESOLVED | Noted: 2023-02-24 | Resolved: 2023-11-07

## 2023-11-07 PROCEDURE — 1036F TOBACCO NON-USER: CPT | Performed by: FAMILY MEDICINE

## 2023-11-07 PROCEDURE — 3078F DIAST BP <80 MM HG: CPT | Performed by: FAMILY MEDICINE

## 2023-11-07 PROCEDURE — 1160F RVW MEDS BY RX/DR IN RCRD: CPT | Performed by: FAMILY MEDICINE

## 2023-11-07 PROCEDURE — 99214 OFFICE O/P EST MOD 30 MIN: CPT | Performed by: FAMILY MEDICINE

## 2023-11-07 PROCEDURE — 1159F MED LIST DOCD IN RCRD: CPT | Performed by: FAMILY MEDICINE

## 2023-11-07 PROCEDURE — 3077F SYST BP >= 140 MM HG: CPT | Performed by: FAMILY MEDICINE

## 2023-11-07 RX ORDER — TRAZODONE HYDROCHLORIDE 50 MG/1
TABLET ORAL
Qty: 15 TABLET | Refills: 11 | Status: SHIPPED | OUTPATIENT
Start: 2023-11-07 | End: 2024-02-20 | Stop reason: DRUGHIGH

## 2023-11-07 RX ORDER — ATORVASTATIN CALCIUM 20 MG/1
20 TABLET, FILM COATED ORAL DAILY
Qty: 90 TABLET | Refills: 3 | Status: SHIPPED | OUTPATIENT
Start: 2023-11-07 | End: 2024-11-06

## 2023-11-07 RX ORDER — METFORMIN HYDROCHLORIDE 750 MG/1
1500 TABLET, EXTENDED RELEASE ORAL DAILY
Qty: 180 TABLET | Refills: 3 | Status: SHIPPED | OUTPATIENT
Start: 2023-11-07 | End: 2024-11-06

## 2023-11-07 RX ORDER — MIRABEGRON 25 MG/1
25 TABLET, FILM COATED, EXTENDED RELEASE ORAL DAILY
Qty: 90 TABLET | Refills: 3 | Status: SHIPPED | OUTPATIENT
Start: 2023-11-07 | End: 2024-02-20 | Stop reason: SDUPTHER

## 2023-11-07 RX ORDER — LISINOPRIL 40 MG/1
40 TABLET ORAL DAILY
Qty: 90 TABLET | Refills: 3 | Status: SHIPPED | OUTPATIENT
Start: 2023-11-07 | End: 2024-11-06

## 2023-11-07 RX ORDER — AMLODIPINE BESYLATE 5 MG/1
5 TABLET ORAL DAILY
Qty: 30 TABLET | Refills: 11 | Status: SHIPPED | OUTPATIENT
Start: 2023-11-07 | End: 2024-11-01

## 2023-11-07 RX ORDER — LABETALOL 200 MG/1
1 TABLET, FILM COATED ORAL 2 TIMES DAILY
Qty: 180 TABLET | Refills: 3 | Status: SHIPPED | OUTPATIENT
Start: 2023-11-07 | End: 2024-05-21 | Stop reason: DRUGHIGH

## 2023-11-07 RX ORDER — FUROSEMIDE 40 MG/1
80 TABLET ORAL DAILY
Qty: 180 TABLET | Refills: 3 | Status: SHIPPED | OUTPATIENT
Start: 2023-11-07 | End: 2024-11-06

## 2023-11-07 ASSESSMENT — ENCOUNTER SYMPTOMS
RHINORRHEA: 0
PALPITATIONS: 0
HEADACHES: 0
ADENOPATHY: 0
FATIGUE: 1
DIARRHEA: 0
BACK PAIN: 1
TROUBLE SWALLOWING: 0
SLEEP DISTURBANCE: 1
ACTIVITY CHANGE: 0
ABDOMINAL PAIN: 0
LIGHT-HEADEDNESS: 0
ABDOMINAL DISTENTION: 0
ARTHRALGIAS: 0
NUMBNESS: 0
WHEEZING: 0
DYSPHORIC MOOD: 1
DIZZINESS: 0
CONSTIPATION: 0
SHORTNESS OF BREATH: 0
COUGH: 0
VOMITING: 0
APPETITE CHANGE: 0
NERVOUS/ANXIOUS: 1
UNEXPECTED WEIGHT CHANGE: 0
DIFFICULTY URINATING: 0
NAUSEA: 0

## 2023-11-07 NOTE — PROGRESS NOTES
Subjective   Patient ID: Alma Delia Walsh is a 83 y.o. female who presents for 3 MO LABS.    HPI   No headache, chest pain, shortness of breath, dizziness, lightheadedness, or edema  No low blood sugars since last OV, seen opthalmology in the past year, and no numbness or tingling in feet, skin normal.  Sees Cardiology  Using a cane, no falls  Shaky inside, nervous and anxious at times, sleeping +/- sometimes has a hard time falling asleep  Pain in left leg, from back to foot, seen Ortho in the past ([facility])  Has a lift chair, stays in home, family helps (son lives with mother at home)  No urine leakage, some urgency      Review of Systems   Constitutional:  Positive for fatigue. Negative for activity change, appetite change and unexpected weight change.   HENT:  Negative for ear pain, nosebleeds, rhinorrhea, sneezing and trouble swallowing.    Respiratory:  Negative for cough, shortness of breath and wheezing.    Cardiovascular:  Negative for chest pain, palpitations and leg swelling.   Gastrointestinal:  Negative for abdominal distention, abdominal pain, constipation, diarrhea, nausea and vomiting.   Genitourinary:  Negative for difficulty urinating.   Musculoskeletal:  Positive for back pain and gait problem. Negative for arthralgias.   Skin:  Negative for rash.   Neurological:  Negative for dizziness, light-headedness, numbness and headaches.   Hematological:  Negative for adenopathy.   Psychiatric/Behavioral:  Positive for dysphoric mood and sleep disturbance. Negative for behavioral problems. The patient is nervous/anxious.    All other systems reviewed and are negative.      Current Outpatient Medications:     atorvastatin (Lipitor) 20 mg tablet, Take 1 tablet (20 mg) by mouth once daily., Disp: , Rfl:     blood sugar diagnostic (Accu-Chek Stefanie Plus test strp) strip, TEST BG ONCE DAILY  DX E11.9, Disp: , Rfl:     furosemide (Lasix) 40 mg tablet, Take 2 tablets (80 mg) by mouth once daily., Disp: , Rfl:      "labetalol (Normodyne) 200 mg tablet, Take 1 tablet (200 mg) by mouth 2 times a day., Disp: , Rfl:     lancets misc, Test blood sugar once daily, Disp: , Rfl:     lisinopril 40 mg tablet, Take 1 tablet (40 mg) by mouth once daily., Disp: , Rfl:     meloxicam (Mobic) 7.5 mg tablet, Take 1 tablet (7.5 mg) by mouth once daily., Disp: 30 tablet, Rfl: 11    metFORMIN XR (Glucophage-XR) 750 mg 24 hr tablet, Take 2 tablets (1,500 mg) by mouth once daily., Disp: , Rfl:     mirabegron (Mybetriq) 25 mg tablet extended release 24 hr 24 hr tablet, Take 1 tablet (25 mg) by mouth once daily., Disp: , Rfl:     sertraline (Zoloft) 100 mg tablet, Take 1 tablet (100 mg) by mouth once daily., Disp: 30 tablet, Rfl: 11      Objective   /70   Pulse 60   Ht 1.575 m (5' 2\")   Wt 66.4 kg (146 lb 6.4 oz)   SpO2 95%   BMI 26.78 kg/m²     Physical Exam  Vitals and nursing note reviewed.   Constitutional:       Appearance: Normal appearance.   HENT:      Head: Normocephalic and atraumatic.      Right Ear: Tympanic membrane, ear canal and external ear normal.      Left Ear: Tympanic membrane, ear canal and external ear normal.      Nose: Nose normal.      Mouth/Throat:      Mouth: Mucous membranes are moist.      Pharynx: Oropharynx is clear.   Cardiovascular:      Rate and Rhythm: Normal rate and regular rhythm.      Pulses: Normal pulses.      Heart sounds: Normal heart sounds.   Pulmonary:      Effort: Pulmonary effort is normal.      Breath sounds: Normal breath sounds.   Musculoskeletal:      Cervical back: Normal range of motion and neck supple.   Neurological:      Mental Status: She is alert.   Psychiatric:         Mood and Affect: Mood normal.         Behavior: Behavior normal.         Assessment/Plan   Problem List Items Addressed This Visit             ICD-10-CM    Coronary artery disease involving autologous artery coronary bypass graft without angina pectoris I25.810     No current symptoms, need to control blood pressure " better.         Relevant Medications    atorvastatin (Lipitor) 20 mg tablet    furosemide (Lasix) 40 mg tablet    labetalol (Normodyne) 200 mg tablet    lisinopril 40 mg tablet    amLODIPine (Norvasc) 5 mg tablet    Other Relevant Orders    Follow Up In Primary Care - Established    Depression F32.A     Having some difficulty with sleeping at night, try adding low-dose trazodone 25 mg at at bedtime, continue with 100 mg sertraline.         Relevant Orders    Follow Up In Primary Care - Established    Diabetes mellitus (CMS/[facility]) E11.9     A1c testing below 6.5, no change.         Relevant Medications    metFORMIN XR (Glucophage-XR) 750 mg 24 hr tablet    Other Relevant Orders    Follow Up In Primary Care - Established    Hyperlipidemia E78.5     Labs stable no change.         Relevant Orders    Follow Up In Primary Care - Established    Hypertension - Primary I10     Not well controlled, try adding amlodipine, monitor for ankle swelling, creatinine is normal with only 1 kidney, no other changes.         Relevant Medications    furosemide (Lasix) 40 mg tablet    labetalol (Normodyne) 200 mg tablet    lisinopril 40 mg tablet    amLODIPine (Norvasc) 5 mg tablet    Other Relevant Orders    Follow Up In Primary Care - Established    Basic Metabolic Panel    Arthritis or polyarthritis, rheumatoid (CMS/[facility]) M06.9     Currently stable.         Relevant Orders    Follow Up In Primary Care - Established    Referral to Pain Medicine    Vitamin B12 deficiency E53.8     Currently stable.         Relevant Orders    Follow Up In Primary Care - Established    Palpitations R00.2    Relevant Orders    Follow Up In Primary Care - Established     Other Visit Diagnoses         Codes    Primary insomnia     F51.01    Relevant Medications    traZODone (Desyrel) 50 mg tablet    Other Relevant Orders    Follow Up In Primary Care - Established    Urge incontinence of urine     N39.41    Relevant Medications    mirabegron (Mybetriq) 25 mg  tablet extended release 24 hr 24 hr tablet    Other Relevant Orders    Follow Up In Primary Care - Established

## 2023-11-07 NOTE — ASSESSMENT & PLAN NOTE
Having some difficulty with sleeping at night, try adding low-dose trazodone 25 mg at at bedtime, continue with 100 mg sertraline.

## 2023-11-07 NOTE — ASSESSMENT & PLAN NOTE
Not well controlled, try adding amlodipine, monitor for ankle swelling, creatinine is normal with only 1 kidney, no other changes.

## 2023-11-07 NOTE — PATIENT INSTRUCTIONS
Start amlodipine once a day for blood pressure, goal to be less than 140/90.  Use 1/2 trazodone at night to help sleep

## 2023-12-11 ENCOUNTER — APPOINTMENT (OUTPATIENT)
Dept: RADIOLOGY | Facility: HOSPITAL | Age: 83
End: 2023-12-11
Payer: MEDICARE

## 2023-12-11 ENCOUNTER — HOSPITAL ENCOUNTER (EMERGENCY)
Facility: HOSPITAL | Age: 83
Discharge: HOME | End: 2023-12-11
Attending: EMERGENCY MEDICINE
Payer: MEDICARE

## 2023-12-11 ENCOUNTER — APPOINTMENT (OUTPATIENT)
Dept: UROLOGY | Facility: CLINIC | Age: 83
End: 2023-12-11
Payer: MEDICARE

## 2023-12-11 VITALS
HEIGHT: 65 IN | SYSTOLIC BLOOD PRESSURE: 166 MMHG | RESPIRATION RATE: 18 BRPM | WEIGHT: 143 LBS | DIASTOLIC BLOOD PRESSURE: 84 MMHG | BODY MASS INDEX: 23.82 KG/M2 | OXYGEN SATURATION: 97 % | HEART RATE: 65 BPM | TEMPERATURE: 97.8 F

## 2023-12-11 DIAGNOSIS — H81.09 MENIERE'S DISEASE, UNSPECIFIED LATERALITY: ICD-10-CM

## 2023-12-11 DIAGNOSIS — R42 VERTIGO: Primary | ICD-10-CM

## 2023-12-11 LAB
ALBUMIN SERPL BCP-MCNC: 3.8 G/DL (ref 3.4–5)
ALP SERPL-CCNC: 78 U/L (ref 33–136)
ALT SERPL W P-5'-P-CCNC: 12 U/L (ref 7–45)
ANION GAP SERPL CALC-SCNC: 11 MMOL/L (ref 10–20)
APPEARANCE UR: CLEAR
APTT PPP: 34 SECONDS (ref 27–38)
AST SERPL W P-5'-P-CCNC: 15 U/L (ref 9–39)
BASOPHILS # BLD AUTO: 0.04 X10*3/UL (ref 0–0.1)
BASOPHILS NFR BLD AUTO: 0.5 %
BILIRUB DIRECT SERPL-MCNC: 0.1 MG/DL (ref 0–0.3)
BILIRUB SERPL-MCNC: 0.4 MG/DL (ref 0–1.2)
BILIRUB UR STRIP.AUTO-MCNC: NEGATIVE MG/DL
BUN SERPL-MCNC: 35 MG/DL (ref 6–23)
CALCIUM SERPL-MCNC: 9 MG/DL (ref 8.6–10.3)
CARDIAC TROPONIN I PNL SERPL HS: 13 NG/L (ref 0–13)
CHLORIDE SERPL-SCNC: 107 MMOL/L (ref 98–107)
CO2 SERPL-SCNC: 29 MMOL/L (ref 21–32)
COLOR UR: YELLOW
CREAT SERPL-MCNC: 0.91 MG/DL (ref 0.5–1.05)
EOSINOPHIL # BLD AUTO: 0.3 X10*3/UL (ref 0–0.4)
EOSINOPHIL NFR BLD AUTO: 4 %
ERYTHROCYTE [DISTWIDTH] IN BLOOD BY AUTOMATED COUNT: 12.9 % (ref 11.5–14.5)
GFR SERPL CREATININE-BSD FRML MDRD: 64 ML/MIN/1.73M*2
GLUCOSE SERPL-MCNC: 107 MG/DL (ref 74–99)
GLUCOSE UR STRIP.AUTO-MCNC: NEGATIVE MG/DL
HCT VFR BLD AUTO: 35 % (ref 36–46)
HGB BLD-MCNC: 11.2 G/DL (ref 12–16)
HOLD SPECIMEN: NORMAL
IMM GRANULOCYTES # BLD AUTO: 0.03 X10*3/UL (ref 0–0.5)
IMM GRANULOCYTES NFR BLD AUTO: 0.4 % (ref 0–0.9)
INR PPP: 1.2 (ref 0.9–1.1)
KETONES UR STRIP.AUTO-MCNC: NEGATIVE MG/DL
LEUKOCYTE ESTERASE UR QL STRIP.AUTO: NEGATIVE
LIPASE SERPL-CCNC: 26 U/L (ref 9–82)
LYMPHOCYTES # BLD AUTO: 1.54 X10*3/UL (ref 0.8–3)
LYMPHOCYTES NFR BLD AUTO: 20.7 %
MAGNESIUM SERPL-MCNC: 2.19 MG/DL (ref 1.6–2.4)
MCH RBC QN AUTO: 30.1 PG (ref 26–34)
MCHC RBC AUTO-ENTMCNC: 32 G/DL (ref 32–36)
MCV RBC AUTO: 94 FL (ref 80–100)
MONOCYTES # BLD AUTO: 0.34 X10*3/UL (ref 0.05–0.8)
MONOCYTES NFR BLD AUTO: 4.6 %
NEUTROPHILS # BLD AUTO: 5.18 X10*3/UL (ref 1.6–5.5)
NEUTROPHILS NFR BLD AUTO: 69.8 %
NITRITE UR QL STRIP.AUTO: NEGATIVE
NRBC BLD-RTO: 0 /100 WBCS (ref 0–0)
PH UR STRIP.AUTO: 7 [PH]
PLATELET # BLD AUTO: 212 X10*3/UL (ref 150–450)
POTASSIUM SERPL-SCNC: 4.2 MMOL/L (ref 3.5–5.3)
PROT SERPL-MCNC: 6.4 G/DL (ref 6.4–8.2)
PROT UR STRIP.AUTO-MCNC: NEGATIVE MG/DL
PROTHROMBIN TIME: 13.3 SECONDS (ref 9.8–12.8)
RBC # BLD AUTO: 3.72 X10*6/UL (ref 4–5.2)
RBC # UR STRIP.AUTO: NEGATIVE /UL
SODIUM SERPL-SCNC: 143 MMOL/L (ref 136–145)
SP GR UR STRIP.AUTO: 1.01
UROBILINOGEN UR STRIP.AUTO-MCNC: <2 MG/DL
WBC # BLD AUTO: 7.4 X10*3/UL (ref 4.4–11.3)

## 2023-12-11 PROCEDURE — 70553 MRI BRAIN STEM W/O & W/DYE: CPT | Performed by: RADIOLOGY

## 2023-12-11 PROCEDURE — 96376 TX/PRO/DX INJ SAME DRUG ADON: CPT | Mod: 59

## 2023-12-11 PROCEDURE — 2500000004 HC RX 250 GENERAL PHARMACY W/ HCPCS (ALT 636 FOR OP/ED)

## 2023-12-11 PROCEDURE — A9575 INJ GADOTERATE MEGLUMI 0.1ML: HCPCS | Performed by: EMERGENCY MEDICINE

## 2023-12-11 PROCEDURE — 80053 COMPREHEN METABOLIC PANEL: CPT | Performed by: EMERGENCY MEDICINE

## 2023-12-11 PROCEDURE — 70553 MRI BRAIN STEM W/O & W/DYE: CPT

## 2023-12-11 PROCEDURE — 2550000001 HC RX 255 CONTRASTS: Performed by: EMERGENCY MEDICINE

## 2023-12-11 PROCEDURE — 85025 COMPLETE CBC W/AUTO DIFF WBC: CPT | Performed by: EMERGENCY MEDICINE

## 2023-12-11 PROCEDURE — 83735 ASSAY OF MAGNESIUM: CPT | Performed by: EMERGENCY MEDICINE

## 2023-12-11 PROCEDURE — 87086 URINE CULTURE/COLONY COUNT: CPT | Mod: SAMLAB | Performed by: EMERGENCY MEDICINE

## 2023-12-11 PROCEDURE — 81003 URINALYSIS AUTO W/O SCOPE: CPT | Performed by: EMERGENCY MEDICINE

## 2023-12-11 PROCEDURE — 71045 X-RAY EXAM CHEST 1 VIEW: CPT

## 2023-12-11 PROCEDURE — 93005 ELECTROCARDIOGRAM TRACING: CPT

## 2023-12-11 PROCEDURE — 82248 BILIRUBIN DIRECT: CPT | Performed by: EMERGENCY MEDICINE

## 2023-12-11 PROCEDURE — 36415 COLL VENOUS BLD VENIPUNCTURE: CPT | Performed by: EMERGENCY MEDICINE

## 2023-12-11 PROCEDURE — 70450 CT HEAD/BRAIN W/O DYE: CPT

## 2023-12-11 PROCEDURE — 99285 EMERGENCY DEPT VISIT HI MDM: CPT | Mod: 25

## 2023-12-11 PROCEDURE — 70450 CT HEAD/BRAIN W/O DYE: CPT | Performed by: RADIOLOGY

## 2023-12-11 PROCEDURE — 83690 ASSAY OF LIPASE: CPT | Performed by: EMERGENCY MEDICINE

## 2023-12-11 PROCEDURE — 96365 THER/PROPH/DIAG IV INF INIT: CPT

## 2023-12-11 PROCEDURE — 85610 PROTHROMBIN TIME: CPT | Performed by: EMERGENCY MEDICINE

## 2023-12-11 PROCEDURE — 96375 TX/PRO/DX INJ NEW DRUG ADDON: CPT

## 2023-12-11 PROCEDURE — 71045 X-RAY EXAM CHEST 1 VIEW: CPT | Performed by: RADIOLOGY

## 2023-12-11 PROCEDURE — 85730 THROMBOPLASTIN TIME PARTIAL: CPT | Performed by: EMERGENCY MEDICINE

## 2023-12-11 PROCEDURE — 84484 ASSAY OF TROPONIN QUANT: CPT | Performed by: EMERGENCY MEDICINE

## 2023-12-11 PROCEDURE — 2500000001 HC RX 250 WO HCPCS SELF ADMINISTERED DRUGS (ALT 637 FOR MEDICARE OP): Performed by: EMERGENCY MEDICINE

## 2023-12-11 PROCEDURE — 2500000004 HC RX 250 GENERAL PHARMACY W/ HCPCS (ALT 636 FOR OP/ED): Performed by: EMERGENCY MEDICINE

## 2023-12-11 PROCEDURE — 99291 CRITICAL CARE FIRST HOUR: CPT | Mod: 25 | Performed by: EMERGENCY MEDICINE

## 2023-12-11 PROCEDURE — 96361 HYDRATE IV INFUSION ADD-ON: CPT

## 2023-12-11 RX ORDER — MECLIZINE HYDROCHLORIDE 25 MG/1
25 TABLET ORAL 3 TIMES DAILY PRN
Qty: 15 TABLET | Refills: 0 | Status: SHIPPED | OUTPATIENT
Start: 2023-12-11 | End: 2023-12-16

## 2023-12-11 RX ORDER — ONDANSETRON HYDROCHLORIDE 2 MG/ML
INJECTION, SOLUTION INTRAVENOUS
Status: COMPLETED
Start: 2023-12-11 | End: 2023-12-11

## 2023-12-11 RX ORDER — SODIUM CHLORIDE 9 MG/ML
125 INJECTION, SOLUTION INTRAVENOUS CONTINUOUS
Status: DISCONTINUED | OUTPATIENT
Start: 2023-12-11 | End: 2023-12-11 | Stop reason: HOSPADM

## 2023-12-11 RX ORDER — CLONIDINE HYDROCHLORIDE 0.1 MG/1
0.2 TABLET ORAL ONCE
Status: COMPLETED | OUTPATIENT
Start: 2023-12-11 | End: 2023-12-11

## 2023-12-11 RX ORDER — PROMETHAZINE HYDROCHLORIDE 25 MG/1
12.5 TABLET ORAL EVERY 8 HOURS PRN
Qty: 12 TABLET | Refills: 0 | Status: SHIPPED | OUTPATIENT
Start: 2023-12-11 | End: 2023-12-15

## 2023-12-11 RX ORDER — ASPIRIN 81 MG/1
81 TABLET ORAL DAILY
COMMUNITY

## 2023-12-11 RX ORDER — ONDANSETRON HYDROCHLORIDE 2 MG/ML
4 INJECTION, SOLUTION INTRAVENOUS ONCE
Status: COMPLETED | OUTPATIENT
Start: 2023-12-11 | End: 2023-12-11

## 2023-12-11 RX ORDER — MECLIZINE HYDROCHLORIDE 25 MG/1
25 TABLET ORAL ONCE
Status: COMPLETED | OUTPATIENT
Start: 2023-12-11 | End: 2023-12-11

## 2023-12-11 RX ORDER — GADOTERATE MEGLUMINE 376.9 MG/ML
15 INJECTION INTRAVENOUS
Status: COMPLETED | OUTPATIENT
Start: 2023-12-11 | End: 2023-12-11

## 2023-12-11 RX ADMIN — ONDANSETRON 4 MG: 2 INJECTION INTRAMUSCULAR; INTRAVENOUS at 11:40

## 2023-12-11 RX ADMIN — ONDANSETRON 4 MG: 2 INJECTION INTRAMUSCULAR; INTRAVENOUS at 12:45

## 2023-12-11 RX ADMIN — SODIUM CHLORIDE 125 ML/HR: 9 INJECTION, SOLUTION INTRAVENOUS at 11:19

## 2023-12-11 RX ADMIN — ONDANSETRON HYDROCHLORIDE 4 MG: 2 INJECTION, SOLUTION INTRAVENOUS at 11:40

## 2023-12-11 RX ADMIN — PROMETHAZINE HYDROCHLORIDE 12.5 MG: 25 INJECTION INTRAMUSCULAR; INTRAVENOUS at 14:50

## 2023-12-11 RX ADMIN — MECLIZINE HYDROCHLORIDE 25 MG: 25 TABLET ORAL at 11:05

## 2023-12-11 RX ADMIN — GADOTERATE MEGLUMINE 15 ML: 376.9 INJECTION INTRAVENOUS at 13:56

## 2023-12-11 RX ADMIN — CLONIDINE HYDROCHLORIDE 0.2 MG: 0.1 TABLET ORAL at 12:46

## 2023-12-11 ASSESSMENT — COLUMBIA-SUICIDE SEVERITY RATING SCALE - C-SSRS
6. HAVE YOU EVER DONE ANYTHING, STARTED TO DO ANYTHING, OR PREPARED TO DO ANYTHING TO END YOUR LIFE?: NO
1. IN THE PAST MONTH, HAVE YOU WISHED YOU WERE DEAD OR WISHED YOU COULD GO TO SLEEP AND NOT WAKE UP?: NO
2. HAVE YOU ACTUALLY HAD ANY THOUGHTS OF KILLING YOURSELF?: NO

## 2023-12-11 ASSESSMENT — ENCOUNTER SYMPTOMS
COUGH: 0
FEVER: 0
NAUSEA: 0
DIZZINESS: 1
FREQUENCY: 0
PHOTOPHOBIA: 0
PALPITATIONS: 0
ABDOMINAL PAIN: 0
FATIGUE: 1
VOMITING: 0
AGITATION: 0
MYALGIAS: 0
CHILLS: 0
LIGHT-HEADEDNESS: 0
SHORTNESS OF BREATH: 0
WOUND: 0

## 2023-12-11 ASSESSMENT — PAIN - FUNCTIONAL ASSESSMENT: PAIN_FUNCTIONAL_ASSESSMENT: 0-10

## 2023-12-11 ASSESSMENT — PAIN SCALES - GENERAL: PAINLEVEL_OUTOF10: 0 - NO PAIN

## 2023-12-11 NOTE — ED PROVIDER NOTES
Chief Complaint: DIZZINESS/SPINNING SENSATION    This is an 83-year-old female who developed dizziness which she describes as a spinning sensation upon returning to her room from the bathroom last night she denies any headache no nausea vomiting no visual changes.  She has had a remote history of vertigo in the past.  She had open heart surgery within the last year or so.  She is on labetalol for blood pressure also.  She does not recall history of high bradycardia in the past however her office visit in November she had a heart rate of only 60 at that time.  She denies any swelling in her legs no chest pain no shortness of breath otherwise no fever or chills.           Review of Systems   Constitutional:  Positive for fatigue. Negative for chills and fever.   HENT: Negative.     Eyes:  Negative for photophobia and visual disturbance.   Respiratory:  Negative for cough and shortness of breath.    Cardiovascular:  Negative for chest pain, palpitations and leg swelling.   Gastrointestinal:  Negative for abdominal pain, nausea and vomiting.   Genitourinary:  Negative for frequency.   Musculoskeletal:  Negative for myalgias.   Skin:  Negative for rash and wound.   Neurological:  Positive for dizziness. Negative for light-headedness.   Psychiatric/Behavioral:  Negative for agitation.    All other systems reviewed and are negative.       Physical Exam  Constitutional:       General: She is not in acute distress.     Appearance: Normal appearance. She is normal weight. She is not ill-appearing.   HENT:      Head: Normocephalic and atraumatic.      Right Ear: Tympanic membrane normal.      Left Ear: Tympanic membrane normal.      Nose: Nose normal.      Mouth/Throat:      Mouth: Mucous membranes are moist.      Pharynx: Oropharynx is clear.   Eyes:      Extraocular Movements: Extraocular movements intact.      Conjunctiva/sclera: Conjunctivae normal.      Pupils: Pupils are equal, round, and reactive to light.       Comments: Positive horizontal nystagmus   Neck:      Vascular: No carotid bruit.   Cardiovascular:      Rate and Rhythm: Bradycardia present.      Pulses: Normal pulses.      Heart sounds: No murmur heard.  Pulmonary:      Effort: Pulmonary effort is normal. No respiratory distress.      Breath sounds: Normal breath sounds. No stridor. No rhonchi or rales.   Abdominal:      General: Abdomen is flat. Bowel sounds are normal. There is no distension.      Palpations: Abdomen is soft. There is no mass.      Tenderness: There is no abdominal tenderness.   Musculoskeletal:         General: No swelling or tenderness. Normal range of motion.      Cervical back: Normal range of motion and neck supple. No rigidity or tenderness.   Lymphadenopathy:      Cervical: No cervical adenopathy.   Skin:     General: Skin is warm and dry.      Capillary Refill: Capillary refill takes less than 2 seconds.      Findings: No rash.   Neurological:      General: No focal deficit present.      Mental Status: She is alert and oriented to person, place, and time. Mental status is at baseline.      Cranial Nerves: No cranial nerve deficit.      Sensory: No sensory deficit.      Motor: No weakness.   Psychiatric:         Mood and Affect: Mood normal.         Behavior: Behavior normal.         Thought Content: Thought content normal.          Labs Reviewed   CBC WITH AUTO DIFFERENTIAL - Abnormal       Result Value    WBC 7.4      nRBC 0.0      RBC 3.72 (*)     Hemoglobin 11.2 (*)     Hematocrit 35.0 (*)     MCV 94      MCH 30.1      MCHC 32.0      RDW 12.9      Platelets 212      Neutrophils % 69.8      Immature Granulocytes %, Automated 0.4      Lymphocytes % 20.7      Monocytes % 4.6      Eosinophils % 4.0      Basophils % 0.5      Neutrophils Absolute 5.18      Immature Granulocytes Absolute, Automated 0.03      Lymphocytes Absolute 1.54      Monocytes Absolute 0.34      Eosinophils Absolute 0.30      Basophils Absolute 0.04     BASIC METABOLIC  PANEL - Abnormal    Glucose 107 (*)     Sodium 143      Potassium 4.2      Chloride 107      Bicarbonate 29      Anion Gap 11      Urea Nitrogen 35 (*)     Creatinine 0.91      eGFR 64      Calcium 9.0     PROTIME-INR - Abnormal    Protime 13.3 (*)     INR 1.2 (*)    MAGNESIUM - Normal    Magnesium 2.19     HEPATIC FUNCTION PANEL - Normal    Albumin 3.8      Bilirubin, Total 0.4      Bilirubin, Direct 0.1      Alkaline Phosphatase 78      ALT 12      AST 15      Total Protein 6.4     LIPASE - Normal    Lipase 26      Narrative:     Venipuncture immediately after or during the administration of Metamizole may lead to falsely low results. Testing should be performed immediately prior to Metamizole dosing.   TROPONIN I, HIGH SENSITIVITY - Normal    Troponin I, High Sensitivity 13      Narrative:     Less than 99th percentile of normal range cutoff-  Female and children under 18 years old <14 ng/L; Male <21 ng/L: Negative  Repeat testing should be performed if clinically indicated.     Female and children under 18 years old 14-50 ng/L; Male 21-50 ng/L:  Consistent with possible cardiac damage and possible increased clinical   risk. Serial measurements may help to assess extent of myocardial damage.     >50 ng/L: Consistent with cardiac damage, increased clinical risk and  myocardial infarction. Serial measurements may help assess extent of   myocardial damage.      NOTE: Children less than 1 year old may have higher baseline troponin   levels and results should be interpreted in conjunction with the overall   clinical context.     NOTE: Troponin I testing is performed using a different   testing methodology at [facility] than at other   [facility] hospitals. Direct result comparisons should only   be made within the same method.   APTT - Normal    aPTT 34      Narrative:     The APTT is no longer used for monitoring Unfractionated Heparin Therapy. For monitoring Heparin Therapy, use the Heparin Assay.    URINALYSIS WITH REFLEX MICROSCOPIC AND CULTURE - Normal    Color, Urine Yellow      Appearance, Urine Clear      Specific Gravity, Urine 1.015      pH, Urine 7.0      Protein, Urine NEGATIVE      Glucose, Urine NEGATIVE      Blood, Urine NEGATIVE      Ketones, Urine NEGATIVE      Bilirubin, Urine NEGATIVE      Urobilinogen, Urine <2.0      Nitrite, Urine NEGATIVE      Leukocyte Esterase, Urine NEGATIVE     URINE CULTURE   URINALYSIS WITH REFLEX MICROSCOPIC AND CULTURE    Narrative:     The following orders were created for panel order Urinalysis with Reflex Microscopic and Culture.  Procedure                               Abnormality         Status                     ---------                               -----------         ------                     Urinalysis with Reflex M...[[id number]]  Normal              Final result               Extra Urine Gray Tube[[id number]]                            Final result                 Please view results for these tests on the individual orders.   EXTRA URINE GRAY TUBE    Extra Tube Hold for add-ons.          MR brain w and wo IV contrast   Final Result   There is mild-to-moderate brain parenchymal volume loss. There is   asymmetric more pronounced brain parenchymal volume loss within the   right temporal lobe when compared with the left with asymmetric   compensatory dilatation of the adjacent temporal horn of the right   lateral ventricle when compared with the left.        There are nonspecific white matter changes within the cerebral   hemispheres bilaterally as well as ill-defined increased signal on   the FLAIR and T2 weighted images overlying the brainstem which while   nonspecific, given the patient's age, likely represent sequelae of   more remote small-vessel ischemic change. There are additional small   foci of bright signal on the T2 weighted images noted within the   subinsular regions, basal ganglia, and thalami bilaterally suggesting   incidental mildly  prominent perivascular spaces and/or small   scattered more remote lacunar infarctions.        MACRO:   None.        Signed by: George Vasquez 12/11/2023 2:05 PM   Dictation workstation:   [id number]      CT head wo IV contrast   Final Result   No acute intracranial bleed or focal mass effect. There is however a   subtle oval-shaped hypodensity in the right basal ganglia as   described, where previously there had been a tiny old discrete   lacunar infarct. An acute or subacute superimposed nonhemorrhagic   lacunar infarct in the right basal ganglia is not excluded by this   exam. Suggest further evaluation with MRI.        Progression of now mild-to-moderate volume loss.        Mild chronic white matter ischemic disease in the deep   periventricular regions.        MACRO:   Darnell Pryor discussed the significance and urgency of this critical   finding by Epic Secure Chat with  JOSE ALBERTO GASTON on 12/11/2023 at 11:38   am.  (**-RCF-**) Findings:  See findings.        Signed by: Darnell Pryor 12/11/2023 11:38 AM   Dictation workstation:   [id number]      XR chest 1 view   Final Result   Previous CABG.  Mild cardiomegaly.  Currently without radiographic   evidence of CHF or pneumonia.        Small mid lateral right lung calcified granuloma.        Eventration of the right diaphragm.        DJD throughout the thoracic spine and in the right glenohumeral joint   as described.        Signed by: Darnell Pryor 12/11/2023 11:41 AM   Dictation workstation:   [id number]           Critical Care    Performed by: Jose Alberto Gaston MD  Authorized by: Jose Alberto Gaston MD    Critical care provider statement:     Critical care time (minutes):  42    Critical care time was exclusive of:  Separately billable procedures and treating other patients    Critical care was necessary to treat or prevent imminent or life-threatening deterioration of the following conditions: Neurologic workup as well as possible stroke with ataxia.    Critical care was  time spent personally by me on the following activities:  Blood draw for specimens, development of treatment plan with patient or surrogate, discussions with consultants, discussions with primary provider, evaluation of patient's response to treatment, ordering and performing treatments and interventions, ordering and review of laboratory studies, ordering and review of radiographic studies, pulse oximetry, re-evaluation of patient's condition and review of old charts       Medical Decision Making  Had initial dizziness vertigo Ménière's disease hypoglycemia electrolyte abnormalities anemia or differential diagnosis.  Her CBC with normal with a normal hemoglobin hematocrit metabolic panel was negative troponin was also negative.  Chest x-ray showed cardiomegaly but no other abnormalities with significant arthritis in the spine at this time.  Patient received IV Hep-Lock Zofran IV x 2's 4 mg and then Antivert p.o. for dizziness.  She felt improved and had no dizziness or nausea with sitting still but was worse with movement.  CT scan showed a questionable oval density in her basal ganglion.  Because of the dizziness and the CT findings of brain attack was started and neurology was consulted.  They felt that the area of the lesion on the basal ganglion was not responsible for any ataxia.  They recommended an MRI and if no acute lesions she can be discharged to follow up and as outpatient treatment.  MRI shows significant atherosclerosis but no acute infarct there appear to be possible old lacunar infarcts but no acute lesions.  Patient received additional Phenergan 12.5 mg for nausea felt markedly improved will be discharged on Antivert as well as Phenergan at this time    Amount and/or Complexity of Data Reviewed  ECG/medicine tests: independent interpretation performed.     Details: Twelve-lead EKG showed a sinus rhythm with slow ventricular response left axis deviation right bundle branch block pattern ossific ST-T  changes at this time         Diagnoses as of 12/11/23 1552   Vertigo   Meniere's disease, unspecified laterality                    Juan R Gaston MD  12/11/23 [id number]

## 2023-12-11 NOTE — DISCHARGE INSTRUCTIONS
BED OR CHAIR REST  DR LEDEZMA FOLLOW UP THIS WEEK  DR LUIS FOLLOW UP FOR ENT  PHENERGAN FOR NAUSEA  ANTIVERT FOR DIZZINESS

## 2023-12-12 LAB — BACTERIA UR CULT: NORMAL

## 2023-12-15 ENCOUNTER — TELEPHONE (OUTPATIENT)
Dept: PRIMARY CARE | Facility: CLINIC | Age: 83
End: 2023-12-15

## 2023-12-15 ENCOUNTER — OFFICE VISIT (OUTPATIENT)
Dept: PRIMARY CARE | Facility: CLINIC | Age: 83
End: 2023-12-15
Payer: MEDICARE

## 2023-12-15 VITALS
OXYGEN SATURATION: 97 % | DIASTOLIC BLOOD PRESSURE: 62 MMHG | WEIGHT: 142.7 LBS | HEIGHT: 65 IN | BODY MASS INDEX: 23.78 KG/M2 | SYSTOLIC BLOOD PRESSURE: 150 MMHG | HEART RATE: 82 BPM

## 2023-12-15 DIAGNOSIS — G89.29 CHRONIC LOW BACK PAIN, UNSPECIFIED BACK PAIN LATERALITY, UNSPECIFIED WHETHER SCIATICA PRESENT: ICD-10-CM

## 2023-12-15 DIAGNOSIS — M54.50 CHRONIC LOW BACK PAIN, UNSPECIFIED BACK PAIN LATERALITY, UNSPECIFIED WHETHER SCIATICA PRESENT: ICD-10-CM

## 2023-12-15 DIAGNOSIS — R42 VERTIGO: Primary | ICD-10-CM

## 2023-12-15 PROCEDURE — 99213 OFFICE O/P EST LOW 20 MIN: CPT | Performed by: FAMILY MEDICINE

## 2023-12-15 PROCEDURE — 3077F SYST BP >= 140 MM HG: CPT | Performed by: FAMILY MEDICINE

## 2023-12-15 PROCEDURE — 3078F DIAST BP <80 MM HG: CPT | Performed by: FAMILY MEDICINE

## 2023-12-15 PROCEDURE — 1036F TOBACCO NON-USER: CPT | Performed by: FAMILY MEDICINE

## 2023-12-15 PROCEDURE — 1159F MED LIST DOCD IN RCRD: CPT | Performed by: FAMILY MEDICINE

## 2023-12-15 PROCEDURE — 1160F RVW MEDS BY RX/DR IN RCRD: CPT | Performed by: FAMILY MEDICINE

## 2023-12-15 PROCEDURE — 1126F AMNT PAIN NOTED NONE PRSNT: CPT | Performed by: FAMILY MEDICINE

## 2023-12-15 RX ORDER — PREDNISONE 10 MG/1
TABLET ORAL
Qty: 30 TABLET | Refills: 0 | Status: SHIPPED | OUTPATIENT
Start: 2023-12-15 | End: 2023-12-26

## 2023-12-15 ASSESSMENT — ENCOUNTER SYMPTOMS
PALPITATIONS: 0
SLEEP DISTURBANCE: 0
NAUSEA: 0
ARTHRALGIAS: 1
VOMITING: 0
FATIGUE: 1
CONFUSION: 0
CONSTIPATION: 0
SHORTNESS OF BREATH: 0
DECREASED CONCENTRATION: 0
DIARRHEA: 0
APPETITE CHANGE: 0
CHEST TIGHTNESS: 0
ABDOMINAL PAIN: 0
NERVOUS/ANXIOUS: 1
SINUS PRESSURE: 0
SINUS PAIN: 0
DIZZINESS: 1
RHINORRHEA: 0
COUGH: 0
ACTIVITY CHANGE: 0
SORE THROAT: 0
HEADACHES: 0

## 2023-12-15 NOTE — PATIENT INSTRUCTIONS
Use the Meclizine as needed for vertigo, we are going to do some therapy for you and use some Prednisone to help with pain

## 2023-12-15 NOTE — PROGRESS NOTES
"Subjective   Patient ID: Alma Delia Walsh is a 83 y.o. female who presents for ER F/U VERTIGO.    HPI   Was in ER 12/11 for dizziness, history of Meniere's syndrome, started with vertigo 12/11 when getting up from bed, no falls, started to bother this AM, did not last very long  Some headaches, no ear issues, had some nausea with the dizziness  Meclizine helps, lasts for minutes  Having some pain and a hard time walking    Review of Systems   Constitutional:  Positive for fatigue. Negative for activity change and appetite change.   HENT:  Negative for congestion, ear discharge, ear pain, nosebleeds, postnasal drip, rhinorrhea, sinus pressure, sinus pain and sore throat.    Respiratory:  Negative for cough, chest tightness and shortness of breath.    Cardiovascular:  Negative for chest pain, palpitations and leg swelling.   Gastrointestinal:  Negative for abdominal pain, constipation, diarrhea, nausea and vomiting.   Musculoskeletal:  Positive for arthralgias and gait problem.   Neurological:  Positive for dizziness. Negative for headaches.   Psychiatric/Behavioral:  Negative for confusion, decreased concentration and sleep disturbance. The patient is nervous/anxious.        Objective   /62   Pulse 82   Ht 1.651 m (5' 5\")   Wt 64.7 kg (142 lb 11.2 oz)   SpO2 97%   BMI 23.75 kg/m²     Physical Exam  Vitals and nursing note reviewed.   Constitutional:       Appearance: Normal appearance.   HENT:      Head: Normocephalic and atraumatic.      Right Ear: Tympanic membrane, ear canal and external ear normal.      Left Ear: Tympanic membrane, ear canal and external ear normal.      Nose: Nose normal.      Mouth/Throat:      Mouth: Mucous membranes are moist.      Pharynx: Oropharynx is clear.   Cardiovascular:      Rate and Rhythm: Normal rate and regular rhythm.      Pulses: Normal pulses.      Heart sounds: Normal heart sounds.   Pulmonary:      Effort: Pulmonary effort is normal.      Breath sounds: Normal " breath sounds.   Musculoskeletal:      Cervical back: Normal range of motion and neck supple.   Neurological:      Mental Status: She is alert.   Psychiatric:         Mood and Affect: Mood normal.         Behavior: Behavior normal.         Assessment/Plan   Problem List Items Addressed This Visit             ICD-10-CM    Low back pain M54.50    Relevant Medications    predniSONE (Deltasone) 10 mg tablet     Other Visit Diagnoses         Codes    Vertigo    -  Primary R42    Seems to be improving to see ENT next week, use meclizine only as needed, arrange for home PT.     Relevant Medications    predniSONE (Deltasone) 10 mg tablet

## 2024-01-15 ENCOUNTER — OFFICE VISIT (OUTPATIENT)
Dept: UROLOGY | Facility: CLINIC | Age: 84
End: 2024-01-15
Payer: MEDICARE

## 2024-01-15 VITALS
DIASTOLIC BLOOD PRESSURE: 60 MMHG | HEART RATE: 99 BPM | WEIGHT: 147 LBS | HEIGHT: 62 IN | SYSTOLIC BLOOD PRESSURE: 124 MMHG | BODY MASS INDEX: 27.05 KG/M2

## 2024-01-15 DIAGNOSIS — R32 URINARY INCONTINENCE, UNSPECIFIED TYPE: ICD-10-CM

## 2024-01-15 PROCEDURE — 3078F DIAST BP <80 MM HG: CPT | Performed by: STUDENT IN AN ORGANIZED HEALTH CARE EDUCATION/TRAINING PROGRAM

## 2024-01-15 PROCEDURE — 99213 OFFICE O/P EST LOW 20 MIN: CPT | Performed by: STUDENT IN AN ORGANIZED HEALTH CARE EDUCATION/TRAINING PROGRAM

## 2024-01-15 PROCEDURE — 3074F SYST BP LT 130 MM HG: CPT | Performed by: STUDENT IN AN ORGANIZED HEALTH CARE EDUCATION/TRAINING PROGRAM

## 2024-01-15 PROCEDURE — 1036F TOBACCO NON-USER: CPT | Performed by: STUDENT IN AN ORGANIZED HEALTH CARE EDUCATION/TRAINING PROGRAM

## 2024-01-15 PROCEDURE — 1160F RVW MEDS BY RX/DR IN RCRD: CPT | Performed by: STUDENT IN AN ORGANIZED HEALTH CARE EDUCATION/TRAINING PROGRAM

## 2024-01-15 PROCEDURE — 1159F MED LIST DOCD IN RCRD: CPT | Performed by: STUDENT IN AN ORGANIZED HEALTH CARE EDUCATION/TRAINING PROGRAM

## 2024-01-15 PROCEDURE — 1126F AMNT PAIN NOTED NONE PRSNT: CPT | Performed by: STUDENT IN AN ORGANIZED HEALTH CARE EDUCATION/TRAINING PROGRAM

## 2024-01-15 NOTE — PROGRESS NOTES
Patient presents to the office today for a 6 MO F/U. Patient has a Chronic Hx of Incontinence. LUTs are chronic and mild. Some frequency and urgency. Denies dysuria and hematuria.. Nocturia x1.. Caffeine does worsen LUTs.. Myrbetriq 25 MG for LUTs..Hx of Kidney Stones. Last Sx were many years ago. Patient only has a right kidney.. Hx of UTI's. Last positive Urine Cx was 04/22.

## 2024-01-15 NOTE — PROGRESS NOTES
Subjective   Patient ID: Alma Delia Walsh is a 83 y.o. female who presents for   Chief Complaint   Patient presents with    Urine Leakage     6 MO F/U        HPI  Patient presents to the office today for a 6-month follow-up. The patient has a chronic history of incontinence and lower urinary tract symptoms (LUTs) which are chronic and mild, including some frequency and urgency but denies dysuria and hematuria. The patient experiences nocturia approximately once per night. It is noted that caffeine intake exacerbates the LUTs. The patient is currently on Myrbetriq 25 mg for LUTs. There is a history of kidney stones, with the last symptoms occurring many years ago, and the patient has only one functioning kidney (right kidney). Additionally, there is a history of urinary tract infections (UTIs), with the last positive urine culture in April 2022.      Review of Systems    All systems were reviewed. Anything negative was noted in the HPI.    Objective   Physical Exam    General: Well developed, well nourished, alert and cooperative, appears in no acute distress   Eyes: Non-injected conjunctiva, sclera clear, no proptosis   Cardiac: Extremities are warm and well perfused. No edema, cyanosis or pallor   Lungs: Breathing is easy, non-labored. Speaking in clear and complete sentences. Normal diaphragmatic movement   MSK: Ambulatory with steady gait, unassisted   Neuro: Alert and oriented to person, place, and time   Psych: Demonstrates good judgment and reason, without hallucinations, abnormal affect or abnormal behaviors   Skin: No obvious lesions, no rashes       No CVA tenderness bilaterally   No suprapubic pain or discomfort       No past medical history on file.      Past Surgical History:   Procedure Laterality Date    OTHER SURGICAL HISTORY  12/30/2019    Colonoscopy    OTHER SURGICAL HISTORY  12/30/2019    Nail avulsion procedure    OTHER SURGICAL HISTORY  12/30/2019    Colonic polypectomy    OTHER SURGICAL HISTORY   12/30/2019    Appendectomy    OTHER SURGICAL HISTORY  12/30/2019    Dilation and curettage    OTHER SURGICAL HISTORY  12/30/2019    Hysterectomy vaginal    OTHER SURGICAL HISTORY  12/30/2019    Nephrectomy    OTHER SURGICAL HISTORY  12/30/2019    Endoscopy    OTHER SURGICAL HISTORY  10/19/2021    Coronary artery bypass graft    OTHER SURGICAL HISTORY  01/07/2020    Cholecystectomy    OTHER SURGICAL HISTORY  01/07/2020    Ovarian cystectomy         Assessment/Plan     1- LUTs are chronic and mild. Denies frequency and urgency. Denies dysuria and hematuria. Nocturia x1. Caffeine does worsen LUTs. No medications for LUTs.  2- Chronic mild lower urinary tract symptoms, history of kidney stones, and single kidney status. No current signs of UTI or kidney stones.       Plan:    We had a very long and extensive discussion with the patient regarding the pathophysiology, differential diagnosis, risk factor, management, natural history, incidence and diagnostic work-up of the condition.      Follow up:     In one year          Rolly Attestation  By signing my name below, ICalin Scribe   attest that this documentation has been prepared under the direction and in the presence of Dr. Silvino Miller

## 2024-02-08 ENCOUNTER — APPOINTMENT (OUTPATIENT)
Dept: PRIMARY CARE | Facility: CLINIC | Age: 84
End: 2024-02-08
Payer: MEDICARE

## 2024-02-13 ENCOUNTER — APPOINTMENT (OUTPATIENT)
Dept: PAIN MEDICINE | Facility: CLINIC | Age: 84
End: 2024-02-13
Payer: MEDICARE

## 2024-02-14 ENCOUNTER — LAB (OUTPATIENT)
Dept: LAB | Facility: LAB | Age: 84
End: 2024-02-14
Payer: MEDICARE

## 2024-02-14 DIAGNOSIS — I10 PRIMARY HYPERTENSION: ICD-10-CM

## 2024-02-14 LAB
ANION GAP SERPL CALC-SCNC: 13 MMOL/L (ref 10–20)
BUN SERPL-MCNC: 30 MG/DL (ref 6–23)
CALCIUM SERPL-MCNC: 9.5 MG/DL (ref 8.6–10.3)
CHLORIDE SERPL-SCNC: 104 MMOL/L (ref 98–107)
CO2 SERPL-SCNC: 31 MMOL/L (ref 21–32)
CREAT SERPL-MCNC: 0.94 MG/DL (ref 0.5–1.05)
EGFRCR SERPLBLD CKD-EPI 2021: 61 ML/MIN/1.73M*2
GLUCOSE SERPL-MCNC: 109 MG/DL (ref 74–99)
POTASSIUM SERPL-SCNC: 4.5 MMOL/L (ref 3.5–5.3)
SODIUM SERPL-SCNC: 143 MMOL/L (ref 136–145)

## 2024-02-14 PROCEDURE — 80048 BASIC METABOLIC PNL TOTAL CA: CPT

## 2024-02-14 PROCEDURE — 36415 COLL VENOUS BLD VENIPUNCTURE: CPT

## 2024-02-20 ENCOUNTER — OFFICE VISIT (OUTPATIENT)
Dept: PRIMARY CARE | Facility: CLINIC | Age: 84
End: 2024-02-20
Payer: MEDICARE

## 2024-02-20 VITALS
DIASTOLIC BLOOD PRESSURE: 60 MMHG | WEIGHT: 144.1 LBS | BODY MASS INDEX: 26.52 KG/M2 | HEIGHT: 62 IN | OXYGEN SATURATION: 98 % | HEART RATE: 66 BPM | SYSTOLIC BLOOD PRESSURE: 140 MMHG

## 2024-02-20 DIAGNOSIS — M06.9 RHEUMATOID ARTHRITIS, INVOLVING UNSPECIFIED SITE, UNSPECIFIED WHETHER RHEUMATOID FACTOR PRESENT (MULTI): ICD-10-CM

## 2024-02-20 DIAGNOSIS — E78.2 MIXED HYPERLIPIDEMIA: ICD-10-CM

## 2024-02-20 DIAGNOSIS — I25.810 CORONARY ARTERY DISEASE INVOLVING AUTOLOGOUS ARTERY CORONARY BYPASS GRAFT WITHOUT ANGINA PECTORIS: ICD-10-CM

## 2024-02-20 DIAGNOSIS — F51.01 PRIMARY INSOMNIA: ICD-10-CM

## 2024-02-20 DIAGNOSIS — R00.2 PALPITATIONS: ICD-10-CM

## 2024-02-20 DIAGNOSIS — E53.8 VITAMIN B12 DEFICIENCY: ICD-10-CM

## 2024-02-20 DIAGNOSIS — N39.41 URGE INCONTINENCE OF URINE: ICD-10-CM

## 2024-02-20 DIAGNOSIS — Z00.00 ROUTINE GENERAL MEDICAL EXAMINATION AT HEALTH CARE FACILITY: Primary | ICD-10-CM

## 2024-02-20 DIAGNOSIS — I10 PRIMARY HYPERTENSION: ICD-10-CM

## 2024-02-20 DIAGNOSIS — F33.0 MILD EPISODE OF RECURRENT MAJOR DEPRESSIVE DISORDER (CMS-HCC): ICD-10-CM

## 2024-02-20 DIAGNOSIS — E11.9 TYPE 2 DIABETES MELLITUS WITHOUT COMPLICATION, WITHOUT LONG-TERM CURRENT USE OF INSULIN (MULTI): ICD-10-CM

## 2024-02-20 PROCEDURE — 1160F RVW MEDS BY RX/DR IN RCRD: CPT | Performed by: FAMILY MEDICINE

## 2024-02-20 PROCEDURE — 1036F TOBACCO NON-USER: CPT | Performed by: FAMILY MEDICINE

## 2024-02-20 PROCEDURE — G0439 PPPS, SUBSEQ VISIT: HCPCS | Performed by: FAMILY MEDICINE

## 2024-02-20 PROCEDURE — 99214 OFFICE O/P EST MOD 30 MIN: CPT | Performed by: FAMILY MEDICINE

## 2024-02-20 PROCEDURE — 1157F ADVNC CARE PLAN IN RCRD: CPT | Performed by: FAMILY MEDICINE

## 2024-02-20 PROCEDURE — 3074F SYST BP LT 130 MM HG: CPT | Performed by: FAMILY MEDICINE

## 2024-02-20 PROCEDURE — 1126F AMNT PAIN NOTED NONE PRSNT: CPT | Performed by: FAMILY MEDICINE

## 2024-02-20 PROCEDURE — 1170F FXNL STATUS ASSESSED: CPT | Performed by: FAMILY MEDICINE

## 2024-02-20 PROCEDURE — 1159F MED LIST DOCD IN RCRD: CPT | Performed by: FAMILY MEDICINE

## 2024-02-20 PROCEDURE — 3078F DIAST BP <80 MM HG: CPT | Performed by: FAMILY MEDICINE

## 2024-02-20 RX ORDER — MIRABEGRON 50 MG/1
50 TABLET, EXTENDED RELEASE ORAL DAILY
Qty: 90 TABLET | Refills: 3 | Status: SHIPPED | OUTPATIENT
Start: 2024-02-20 | End: 2025-02-19

## 2024-02-20 RX ORDER — TRAZODONE HYDROCHLORIDE 50 MG/1
25 TABLET ORAL NIGHTLY
Qty: 15 TABLET | Refills: 11 | Status: SHIPPED | OUTPATIENT
Start: 2024-02-20 | End: 2025-02-19

## 2024-02-20 ASSESSMENT — ACTIVITIES OF DAILY LIVING (ADL)
BATHING: INDEPENDENT
MANAGING_FINANCES: TOTAL CARE
DRESSING: INDEPENDENT
DOING_HOUSEWORK: NEEDS ASSISTANCE
TAKING_MEDICATION: INDEPENDENT
GROCERY_SHOPPING: NEEDS ASSISTANCE

## 2024-02-20 ASSESSMENT — ENCOUNTER SYMPTOMS
FATIGUE: 1
NERVOUS/ANXIOUS: 1
VOMITING: 0
LOSS OF SENSATION IN FEET: 0
ADENOPATHY: 0
RHINORRHEA: 0
APPETITE CHANGE: 0
NUMBNESS: 0
TROUBLE SWALLOWING: 0
SHORTNESS OF BREATH: 0
PALPITATIONS: 0
ABDOMINAL DISTENTION: 0
DYSPHORIC MOOD: 1
LIGHT-HEADEDNESS: 0
ABDOMINAL PAIN: 0
DEPRESSION: 1
HEADACHES: 0
ARTHRALGIAS: 0
DIARRHEA: 0
ACTIVITY CHANGE: 0
CONSTIPATION: 0
UNEXPECTED WEIGHT CHANGE: 0
BACK PAIN: 1
CONFUSION: 0
WHEEZING: 0
OCCASIONAL FEELINGS OF UNSTEADINESS: 1
AGITATION: 0
NAUSEA: 0
DIZZINESS: 0
SLEEP DISTURBANCE: 0
COUGH: 0
DIFFICULTY URINATING: 0

## 2024-02-20 ASSESSMENT — PATIENT HEALTH QUESTIONNAIRE - PHQ9: 1. LITTLE INTEREST OR PLEASURE IN DOING THINGS: NOT AT ALL

## 2024-02-20 NOTE — ASSESSMENT & PLAN NOTE
Patient still with a blue dysthymic type mood, is sleeping better at night, family supportive, no change.

## 2024-02-20 NOTE — ASSESSMENT & PLAN NOTE
Blood pressure stable today renal function stable on recent blood test, continue with current medications.

## 2024-02-20 NOTE — PROGRESS NOTES
Subjective   Reason for Visit: Alma Delia Walsh is an 83 y.o. female here for a Medicare Wellness visit.     Past Medical, Surgical, and Family History reviewed and updated in chart.    Reviewed all medications by prescribing practitioner or clinical pharmacist (such as prescriptions, OTCs, herbal therapies and supplements) and documented in the medical record.    HPI  No headache, chest pain, shortness of breath, dizziness, lightheadedness, or edema  No low blood sugars since last OV, seen opthalmology in the past year, and no numbness or tingling in feet, skin normal.  Seen Cardiology in January, no change in medicines  Using a cane for walking, no falls, using a chair lift and modified things at home for safety  Sleeping some better  No more vertigo  Appetite OK, some constipation at times  Some urine leakage at times, trying to do scheduled voiding every 2 hours   Son helps at home and lives with patient  Pain in knees at times, does HEP per PT      Patient Care Team:  Duc Ramirez MD as PCP - General (Family Medicine)  Duc Ramirez MD as PCP - [facility]ributed [facility]  Duc Ramirez MD as PCP - [employer] [facility] PCP     Review of Systems   Constitutional:  Positive for fatigue. Negative for activity change, appetite change and unexpected weight change.   HENT:  Negative for ear pain, nosebleeds, rhinorrhea, sneezing and trouble swallowing.    Respiratory:  Negative for cough, shortness of breath and wheezing.    Cardiovascular:  Negative for chest pain, palpitations and leg swelling.   Gastrointestinal:  Negative for abdominal distention, abdominal pain, constipation, diarrhea, nausea and vomiting.   Genitourinary:  Negative for difficulty urinating.   Musculoskeletal:  Positive for back pain and gait problem. Negative for arthralgias.   Skin:  Negative for rash.   Neurological:  Negative for dizziness, light-headedness, numbness and headaches.   Hematological:  Negative for adenopathy.  "  Psychiatric/Behavioral:  Positive for dysphoric mood. Negative for agitation, behavioral problems, confusion and sleep disturbance. The patient is nervous/anxious.    All other systems reviewed and are negative.      Objective   Vitals:  /60   Pulse 66   Ht 1.575 m (5' 2\")   Wt 65.4 kg (144 lb 1.6 oz)   SpO2 98%   BMI 26.36 kg/m²       Physical Exam  Vitals and nursing note reviewed.   Constitutional:       Appearance: Normal appearance.   HENT:      Head: Normocephalic and atraumatic.      Right Ear: Tympanic membrane, ear canal and external ear normal.      Left Ear: Tympanic membrane, ear canal and external ear normal.      Nose: Nose normal.      Mouth/Throat:      Mouth: Mucous membranes are moist.      Pharynx: Oropharynx is clear.   Cardiovascular:      Rate and Rhythm: Normal rate and regular rhythm.      Pulses: Normal pulses.      Heart sounds: Normal heart sounds.   Pulmonary:      Effort: Pulmonary effort is normal.      Breath sounds: Normal breath sounds.   Musculoskeletal:      Cervical back: Normal range of motion and neck supple.   Neurological:      Mental Status: She is alert.   Psychiatric:         Mood and Affect: Mood normal.         Behavior: Behavior normal.         Assessment/Plan   Problem List Items Addressed This Visit       Coronary artery disease involving autologous artery coronary bypass graft without angina pectoris    Current Assessment & Plan     Sees cardiologist, no change with symptoms, tolerating medications.         Relevant Orders    Follow Up In Primary Care - Established    Lipid Panel    Depression    Current Assessment & Plan     Patient still with a blue dysthymic type mood, is sleeping better at night, family supportive, no change.         Relevant Orders    Follow Up In Primary Care - Established    Diabetes mellitus (CMS/[facility])    Current Assessment & Plan     Will check A1c testing at next office visit continue with current medications.         Relevant " Orders    Follow Up In Primary Care - Established    Albumin , Urine Random    Comprehensive Metabolic Panel    Hemoglobin A1C    Thyroid Stimulating Hormone    Lipid Panel    Hyperlipidemia    Current Assessment & Plan     Tolerating a atorvastatin, check labs at follow-up appointment.         Relevant Orders    Follow Up In Primary Care - Established    Comprehensive Metabolic Panel    Lipid Panel    Hypertension    Current Assessment & Plan     Blood pressure stable today renal function stable on recent blood test, continue with current medications.         Relevant Orders    Follow Up In Primary Care - Established    Albumin , Urine Random    Comprehensive Metabolic Panel    Arthritis or polyarthritis, rheumatoid (CMS/HCC)    Current Assessment & Plan     Continue with current medications.         Relevant Orders    Follow Up In Primary Care - Established    Urge incontinence of urine    Current Assessment & Plan     Requested increase the Myrbetriq to 50 mg a day.         Relevant Medications    mirabegron (Myrbetriq) 50 mg tablet extended release 24 hr 24 hr tablet    Other Relevant Orders    Follow Up In Primary Care - Established    Vitamin B12 deficiency    Relevant Orders    Follow Up In Primary Care - Established    Palpitations    Relevant Orders    Follow Up In Primary Care - Established    Primary insomnia    Current Assessment & Plan     Sleeping better with 25 mg of trazodone at night.         Relevant Medications    traZODone (Desyrel) 50 mg tablet    Other Relevant Orders    Follow Up In Primary Care - Established     Other Visit Diagnoses       Routine general medical examination at health care facility    -  Primary    Relevant Orders    Follow Up In Primary Care - Established

## 2024-02-20 NOTE — PATIENT INSTRUCTIONS
Be sure to use daily fiber for bowels, you can use up to 3000 mg of Tylenol as needed in a day.  Increase Myrbetriq to 50 mg a day.

## 2024-02-22 ENCOUNTER — APPOINTMENT (OUTPATIENT)
Dept: PAIN MEDICINE | Facility: CLINIC | Age: 84
End: 2024-02-22
Payer: MEDICARE

## 2024-02-29 ENCOUNTER — OFFICE VISIT (OUTPATIENT)
Dept: PAIN MEDICINE | Facility: CLINIC | Age: 84
End: 2024-02-29
Payer: MEDICARE

## 2024-02-29 DIAGNOSIS — M16.12 ARTHRITIS OF LEFT HIP: ICD-10-CM

## 2024-02-29 DIAGNOSIS — M25.552 LEFT HIP PAIN: Primary | ICD-10-CM

## 2024-02-29 PROCEDURE — 99214 OFFICE O/P EST MOD 30 MIN: CPT | Performed by: PHYSICIAN ASSISTANT

## 2024-02-29 ASSESSMENT — COLUMBIA-SUICIDE SEVERITY RATING SCALE - C-SSRS
2. HAVE YOU ACTUALLY HAD ANY THOUGHTS OF KILLING YOURSELF?: NO
1. IN THE PAST MONTH, HAVE YOU WISHED YOU WERE DEAD OR WISHED YOU COULD GO TO SLEEP AND NOT WAKE UP?: NO
6. HAVE YOU EVER DONE ANYTHING, STARTED TO DO ANYTHING, OR PREPARED TO DO ANYTHING TO END YOUR LIFE?: NO

## 2024-02-29 ASSESSMENT — ENCOUNTER SYMPTOMS
BACK PAIN: 1
GASTROINTESTINAL NEGATIVE: 1
MYALGIAS: 1
ALLERGIC/IMMUNOLOGIC NEGATIVE: 1
ARTHRALGIAS: 1
CARDIOVASCULAR NEGATIVE: 1
RESPIRATORY NEGATIVE: 1
CONSTITUTIONAL NEGATIVE: 1
ENDOCRINE NEGATIVE: 1
PSYCHIATRIC NEGATIVE: 1
HEMATOLOGIC/LYMPHATIC NEGATIVE: 1
EYES NEGATIVE: 1
JOINT SWELLING: 1

## 2024-02-29 ASSESSMENT — PATIENT HEALTH QUESTIONNAIRE - PHQ9
SUM OF ALL RESPONSES TO PHQ9 QUESTIONS 1 AND 2: 0
2. FEELING DOWN, DEPRESSED OR HOPELESS: NOT AT ALL
1. LITTLE INTEREST OR PLEASURE IN DOING THINGS: NOT AT ALL

## 2024-02-29 NOTE — PROGRESS NOTES
Subjective   Patient ID: Alma Delia Walsh is a 83 y.o. female who presents for Back Pain (New pt visit here evaluation of lt side lower back pain that radiates into her lt gluteal, hip, groin, lt thigh laterally down to her knee, rates pain score 6/10 now and 10/10 at worst with activity, describes as aching mostly and at worst shooting pain that intensifies.  Her pain started 4 years ago she has arthritis she has only taken tylenol 2 tabs  and Meloxicam these help her pain she walks with a cane and does not walk very much due to increased pain. ) JACQUI score 52%, ORT score 1, falls, depression and smoking negative.       La Victor RN 02/29/24 2:40 PM     Patient is an 83-year-old female.  She presents today as a new patient with her daughter-in-law.  She has left lower back pain with left buttock pain and left groin and leg pain.  This was down to her knee.  She states that this started years ago without any significant incident or trauma.  She has trialed a multitude of medications including over-the-counter medications, she has tried Tylenol and meloxicam.  This gave her some improvement not quite enough.  She rates the discomfort a 6-10/10.  It affects her ambulatory status.  Affects her quality of life.  Affects her activities.  Patient recently saw Ortho.  They discussed replacement versus an injection.  She is thinking about the replacement but wanted to come today to talk about the injection.  She has difficulty standing up because of the pain.  She is difficulty getting into the car, going up and down the stairs, and doing almost any activity during the day because of it.        Review of Systems   Constitutional: Negative.    HENT: Negative.     Eyes: Negative.    Respiratory: Negative.     Cardiovascular: Negative.    Gastrointestinal: Negative.    Endocrine: Negative.    Genitourinary: Negative.    Musculoskeletal:  Positive for arthralgias, back pain, gait problem, joint swelling and myalgias.   Skin:  Negative.    Allergic/Immunologic: Negative.    Hematological: Negative.    Psychiatric/Behavioral: Negative.         Objective   Physical Exam  Vitals and nursing note reviewed.   Constitutional:       Appearance: Normal appearance.   HENT:      Head: Normocephalic and atraumatic.      Right Ear: External ear normal.      Left Ear: External ear normal.      Nose: Nose normal.      Mouth/Throat:      Pharynx: Oropharynx is clear.   Eyes:      Pupils: Pupils are equal, round, and reactive to light.   Cardiovascular:      Rate and Rhythm: Normal rate and regular rhythm.      Pulses: Normal pulses.   Pulmonary:      Effort: Pulmonary effort is normal.   Musculoskeletal:         General: Normal range of motion.      Cervical back: Normal range of motion.      Comments: Difficulty standing upright due to pain and just joint issues  Ambulating with a cane  Pain with internal and external rotation of the left hip with diminished range of motion  Otherwise normal strength   Skin:     General: Skin is warm and dry.   Neurological:      General: No focal deficit present.      Mental Status: She is alert and oriented to person, place, and time. Mental status is at baseline.   Psychiatric:         Mood and Affect: Mood normal.         Behavior: Behavior normal.         Thought Content: Thought content normal.         Judgment: Judgment normal.       XR hip left with pelvis when performed 2 or 3 views  Order: [id number]  Impression      No acute osseous abnormality.    Severe left hip osteoarthritis.    Osteopenia.    [employer]          Workstation ID:   [id number]  Narrative    EXAMINATION:  XR HIP LEFT 2-3 VIEWS (ROUTINE)    HISTORY:  ORDERING SYSTEM PROVIDED HISTORY:  Pain,     TECHNOLOGIST PROVIDED HISTORY:   Illness/Other  Reason for exam: chronic left hip and lower bck pain unable to stand staright up  Cancer History: u  Surgery, RadiationHistory: u  Encounter Type: Initial  Additional signs and symptoms: none    ORDERING SYSTEM  PROVIDED DIAGNOSIS CODES:  R52 Pain      COMPARISON:  None.    FINDINGS:  Two views of the left hip.    Diffuse osteopenia.  No acute osseous abnormality.  Severe left hip joint space narrowing with superolateral migration of the femoral head, bone-on-bone appearance, subchondral sclerosis and osteophytic spurring.    Assessment/Plan   Diagnoses and all orders for this visit:  Left hip pain  Arthritis of left hip       Patient is an 83-year-old female with a past medical history significant for left hip pain and left hip arthritis.  We reviewed her x-ray.  We discussed different options.  She has seen Ortho to discuss possible replacement but she is thinking about it.  We discussed left-sided intra-articular hip injection to be done in the OR under fluoroscopy.  Procedure was discussed.  Risk and benefits were discussed.  Medication hold including meloxicam for 4 days and aspirin for 1 week was discussed.  Patient voiced understanding.  Diagnosis for the left hip injection would be left hip arthritis-M16.12.  Patient will let us know what she decides.  If she decides to pursue the injection she will follow-up 2 weeks after.

## 2024-05-14 ENCOUNTER — LAB (OUTPATIENT)
Dept: LAB | Facility: LAB | Age: 84
End: 2024-05-14
Payer: MEDICARE

## 2024-05-14 DIAGNOSIS — I10 PRIMARY HYPERTENSION: ICD-10-CM

## 2024-05-14 DIAGNOSIS — E78.2 MIXED HYPERLIPIDEMIA: ICD-10-CM

## 2024-05-14 DIAGNOSIS — I25.810 CORONARY ARTERY DISEASE INVOLVING AUTOLOGOUS ARTERY CORONARY BYPASS GRAFT WITHOUT ANGINA PECTORIS: ICD-10-CM

## 2024-05-14 DIAGNOSIS — E11.9 TYPE 2 DIABETES MELLITUS WITHOUT COMPLICATION, WITHOUT LONG-TERM CURRENT USE OF INSULIN (MULTI): ICD-10-CM

## 2024-05-14 LAB
ALBUMIN SERPL BCP-MCNC: 4.2 G/DL (ref 3.4–5)
ALP SERPL-CCNC: 73 U/L (ref 33–136)
ALT SERPL W P-5'-P-CCNC: 11 U/L (ref 7–45)
ANION GAP SERPL CALC-SCNC: 12 MMOL/L (ref 10–20)
AST SERPL W P-5'-P-CCNC: 15 U/L (ref 9–39)
BILIRUB SERPL-MCNC: 0.5 MG/DL (ref 0–1.2)
BUN SERPL-MCNC: 33 MG/DL (ref 6–23)
CALCIUM SERPL-MCNC: 9.3 MG/DL (ref 8.6–10.3)
CHLORIDE SERPL-SCNC: 105 MMOL/L (ref 98–107)
CHOLEST SERPL-MCNC: 137 MG/DL (ref 0–199)
CHOLESTEROL/HDL RATIO: 2.8
CO2 SERPL-SCNC: 29 MMOL/L (ref 21–32)
CREAT SERPL-MCNC: 1.07 MG/DL (ref 0.5–1.05)
CREAT UR-MCNC: 82.4 MG/DL (ref 20–320)
EGFRCR SERPLBLD CKD-EPI 2021: 52 ML/MIN/1.73M*2
EST. AVERAGE GLUCOSE BLD GHB EST-MCNC: 126 MG/DL
GLUCOSE SERPL-MCNC: 124 MG/DL (ref 74–99)
HBA1C MFR BLD: 6 %
HDLC SERPL-MCNC: 49 MG/DL
LDLC SERPL CALC-MCNC: 65 MG/DL
MICROALBUMIN UR-MCNC: 15.6 MG/L
MICROALBUMIN/CREAT UR: 18.9 UG/MG CREAT
NON HDL CHOLESTEROL: 88 MG/DL (ref 0–149)
POTASSIUM SERPL-SCNC: 4.4 MMOL/L (ref 3.5–5.3)
PROT SERPL-MCNC: 6.5 G/DL (ref 6.4–8.2)
SODIUM SERPL-SCNC: 142 MMOL/L (ref 136–145)
TRIGL SERPL-MCNC: 113 MG/DL (ref 0–149)
TSH SERPL-ACNC: 2.65 MIU/L (ref 0.44–3.98)
VLDL: 23 MG/DL (ref 0–40)

## 2024-05-14 PROCEDURE — 82043 UR ALBUMIN QUANTITATIVE: CPT

## 2024-05-14 PROCEDURE — 84443 ASSAY THYROID STIM HORMONE: CPT

## 2024-05-14 PROCEDURE — 80061 LIPID PANEL: CPT

## 2024-05-14 PROCEDURE — 36415 COLL VENOUS BLD VENIPUNCTURE: CPT

## 2024-05-14 PROCEDURE — 83036 HEMOGLOBIN GLYCOSYLATED A1C: CPT

## 2024-05-14 PROCEDURE — 82570 ASSAY OF URINE CREATININE: CPT

## 2024-05-14 PROCEDURE — 80053 COMPREHEN METABOLIC PANEL: CPT

## 2024-05-21 ENCOUNTER — OFFICE VISIT (OUTPATIENT)
Dept: PRIMARY CARE | Facility: CLINIC | Age: 84
End: 2024-05-21
Payer: MEDICARE

## 2024-05-21 VITALS
DIASTOLIC BLOOD PRESSURE: 60 MMHG | WEIGHT: 141.2 LBS | HEART RATE: 69 BPM | BODY MASS INDEX: 25.98 KG/M2 | SYSTOLIC BLOOD PRESSURE: 140 MMHG | OXYGEN SATURATION: 98 % | HEIGHT: 62 IN

## 2024-05-21 DIAGNOSIS — N39.41 URGE INCONTINENCE OF URINE: ICD-10-CM

## 2024-05-21 DIAGNOSIS — R00.2 PALPITATIONS: ICD-10-CM

## 2024-05-21 DIAGNOSIS — E78.2 MIXED HYPERLIPIDEMIA: ICD-10-CM

## 2024-05-21 DIAGNOSIS — E53.8 VITAMIN B12 DEFICIENCY: ICD-10-CM

## 2024-05-21 DIAGNOSIS — F33.0 MILD EPISODE OF RECURRENT MAJOR DEPRESSIVE DISORDER (CMS-HCC): ICD-10-CM

## 2024-05-21 DIAGNOSIS — M06.9 RHEUMATOID ARTHRITIS, INVOLVING UNSPECIFIED SITE, UNSPECIFIED WHETHER RHEUMATOID FACTOR PRESENT (MULTI): ICD-10-CM

## 2024-05-21 DIAGNOSIS — E11.9 TYPE 2 DIABETES MELLITUS WITHOUT COMPLICATION, WITHOUT LONG-TERM CURRENT USE OF INSULIN (MULTI): ICD-10-CM

## 2024-05-21 DIAGNOSIS — I10 PRIMARY HYPERTENSION: ICD-10-CM

## 2024-05-21 DIAGNOSIS — Z00.00 ROUTINE GENERAL MEDICAL EXAMINATION AT HEALTH CARE FACILITY: ICD-10-CM

## 2024-05-21 DIAGNOSIS — F51.01 PRIMARY INSOMNIA: ICD-10-CM

## 2024-05-21 DIAGNOSIS — I25.810 CORONARY ARTERY DISEASE INVOLVING AUTOLOGOUS ARTERY CORONARY BYPASS GRAFT WITHOUT ANGINA PECTORIS: ICD-10-CM

## 2024-05-21 PROCEDURE — 3077F SYST BP >= 140 MM HG: CPT | Performed by: FAMILY MEDICINE

## 2024-05-21 PROCEDURE — 1159F MED LIST DOCD IN RCRD: CPT | Performed by: FAMILY MEDICINE

## 2024-05-21 PROCEDURE — 99213 OFFICE O/P EST LOW 20 MIN: CPT | Performed by: FAMILY MEDICINE

## 2024-05-21 PROCEDURE — 1160F RVW MEDS BY RX/DR IN RCRD: CPT | Performed by: FAMILY MEDICINE

## 2024-05-21 PROCEDURE — 1157F ADVNC CARE PLAN IN RCRD: CPT | Performed by: FAMILY MEDICINE

## 2024-05-21 PROCEDURE — 3078F DIAST BP <80 MM HG: CPT | Performed by: FAMILY MEDICINE

## 2024-05-21 PROCEDURE — 1036F TOBACCO NON-USER: CPT | Performed by: FAMILY MEDICINE

## 2024-05-21 RX ORDER — SERTRALINE HYDROCHLORIDE 100 MG/1
100 TABLET, FILM COATED ORAL DAILY
Qty: 30 TABLET | Refills: 11 | Status: SHIPPED | OUTPATIENT
Start: 2024-05-21 | End: 2025-05-21

## 2024-05-21 RX ORDER — LABETALOL 200 MG/1
100 TABLET, FILM COATED ORAL 2 TIMES DAILY
Qty: 90 TABLET | Refills: 3 | Status: SHIPPED | OUTPATIENT
Start: 2024-05-21 | End: 2024-05-23 | Stop reason: ALTCHOICE

## 2024-05-21 ASSESSMENT — ENCOUNTER SYMPTOMS
UNEXPECTED WEIGHT CHANGE: 0
VOMITING: 0
ACTIVITY CHANGE: 0
ADENOPATHY: 0
SLEEP DISTURBANCE: 0
ABDOMINAL DISTENTION: 0
DYSPHORIC MOOD: 1
WHEEZING: 0
LIGHT-HEADEDNESS: 0
RHINORRHEA: 0
DIARRHEA: 0
TROUBLE SWALLOWING: 0
DIFFICULTY URINATING: 0
DIZZINESS: 0
CONSTIPATION: 0
NERVOUS/ANXIOUS: 0
FATIGUE: 0
HEADACHES: 0
PALPITATIONS: 0
ABDOMINAL PAIN: 0
SHORTNESS OF BREATH: 0
APPETITE CHANGE: 0
NAUSEA: 0
COUGH: 0
ARTHRALGIAS: 1
NUMBNESS: 0

## 2024-05-21 NOTE — PROGRESS NOTES
"Subjective   Patient ID: Alma Delia Walsh is a 83 y.o. female who presents for 3 MO LABS.    DELGADO Hobbs is planning a left THR next week, had see Cardiology in April  No low blood sugars since last OV, seen opthalmology in the past year, and no numbness or tingling in feet, skin normal.  No headache, chest pain, shortness of breath, dizziness, lightheadedness, or edema  Using a cane, no falls, seeing ortho, using APAP for pain, uses a rollator at home  No more vertigo, appetite OK, some upset stomach at times, some constipation at times  Bladder hard to get to toilet on time at times, uses a pad, Myrbetriq helps  Sleeping OK at night, blue and sad at times    Review of Systems   Constitutional:  Negative for activity change, appetite change, fatigue and unexpected weight change.   HENT:  Negative for ear pain, nosebleeds, rhinorrhea, sneezing and trouble swallowing.    Respiratory:  Negative for cough, shortness of breath and wheezing.    Cardiovascular:  Negative for chest pain, palpitations and leg swelling.   Gastrointestinal:  Negative for abdominal distention, abdominal pain, constipation, diarrhea, nausea and vomiting.   Genitourinary:  Negative for difficulty urinating.   Musculoskeletal:  Positive for arthralgias and gait problem.   Skin:  Negative for rash.   Neurological:  Negative for dizziness, light-headedness, numbness and headaches.   Hematological:  Negative for adenopathy.   Psychiatric/Behavioral:  Positive for dysphoric mood. Negative for behavioral problems and sleep disturbance. The patient is not nervous/anxious.    All other systems reviewed and are negative.      Objective   /60   Pulse 69   Ht 1.575 m (5' 2\")   Wt 64 kg (141 lb 3.2 oz)   SpO2 98%   BMI 25.83 kg/m²     Physical Exam  Vitals and nursing note reviewed.   Constitutional:       Appearance: Normal appearance.   HENT:      Head: Normocephalic and atraumatic.      Right Ear: Tympanic membrane, ear canal and external " ear normal.      Left Ear: Tympanic membrane, ear canal and external ear normal.      Nose: Nose normal.      Mouth/Throat:      Mouth: Mucous membranes are moist.      Pharynx: Oropharynx is clear.   Cardiovascular:      Rate and Rhythm: Normal rate and regular rhythm.      Pulses: Normal pulses.      Heart sounds: Normal heart sounds.   Pulmonary:      Effort: Pulmonary effort is normal.      Breath sounds: Normal breath sounds.   Musculoskeletal:      Cervical back: Normal range of motion and neck supple.   Neurological:      Mental Status: She is alert.   Psychiatric:         Mood and Affect: Mood normal.         Behavior: Behavior normal.         Assessment/Plan   Problem List Items Addressed This Visit             ICD-10-CM    Coronary artery disease involving autologous artery coronary bypass graft without angina pectoris I25.810     Seen cardiology in the past month, no change with medication.         Relevant Medications    labetalol (Normodyne) 200 mg tablet    Other Relevant Orders    Follow Up In Primary Care - Established    Depression F32.A     Still with overall dysthymia, continue with current medication.         Relevant Medications    sertraline (Zoloft) 100 mg tablet    Other Relevant Orders    Follow Up In Primary Care - Established    Diabetes mellitus (Multi) E11.9     A1c testing at 6.0, no change with medications.         Relevant Orders    Follow Up In Primary Care - Established    Cholesterol, LDL Direct    Comprehensive Metabolic Panel    Hemoglobin A1C    Hyperlipidemia E78.5     Continue with atorvastatin, lipid profile stable, no change.         Relevant Orders    Follow Up In Primary Care - Established    Cholesterol, LDL Direct    Comprehensive Metabolic Panel    Hypertension I10     Blood pressure stable, renal function stable, no change.         Relevant Medications    labetalol (Normodyne) 200 mg tablet    Other Relevant Orders    Follow Up In Primary Care - Established     Comprehensive Metabolic Panel    Arthritis or polyarthritis, rheumatoid (Multi) M06.9     Not currently on medication, symptoms seem to be stable, has not followed with rheumatology in some time.         Relevant Orders    Follow Up In Primary Care - Established    Urge incontinence of urine N39.41    Relevant Orders    Follow Up In Primary Care - Established    Vitamin B12 deficiency E53.8    Relevant Orders    Follow Up In Primary Care - Established    Palpitations R00.2    Relevant Orders    Follow Up In Primary Care - Established    Primary insomnia F51.01     Continue with trazodone at night.         Relevant Orders    Follow Up In Primary Care - Established     Other Visit Diagnoses         Codes    Routine general medical examination at health care facility     Z00.00    Relevant Orders    Follow Up In Primary Care - Established

## 2024-05-21 NOTE — ASSESSMENT & PLAN NOTE
Not currently on medication, symptoms seem to be stable, has not followed with rheumatology in some time.

## 2024-05-23 ENCOUNTER — TELEPHONE (OUTPATIENT)
Dept: PRIMARY CARE | Facility: CLINIC | Age: 84
End: 2024-05-23
Payer: MEDICARE

## 2024-05-23 DIAGNOSIS — I10 PRIMARY HYPERTENSION: ICD-10-CM

## 2024-05-23 DIAGNOSIS — I25.810 CORONARY ARTERY DISEASE INVOLVING AUTOLOGOUS ARTERY CORONARY BYPASS GRAFT WITHOUT ANGINA PECTORIS: ICD-10-CM

## 2024-05-23 RX ORDER — LABETALOL 100 MG/1
100 TABLET, FILM COATED ORAL 2 TIMES DAILY
COMMUNITY

## 2024-05-23 NOTE — TELEPHONE ENCOUNTER
PATIENT CALLED, LOST  HER LABETALOL 200 MG   TAKING 0.5 TWICE  DAY MED FROM US FILLED 5-7-24 .   [facility]OKE WITH [facility] , PATIENT ALSO HAS A LABETALOL  100 MG BID SCRIPT FROM [facility]ARD[facility].   PATIENT WANTS  MG TAKING BID.   I CALLED [facility] AND ASKED THEM O FILL THE LABETALOL 100 MG  FROM CARDIOLOGY.    CAN  YOU CHANGE MED LOG OSMAN MATCH THIS PLEASE.

## 2024-09-09 ENCOUNTER — APPOINTMENT (OUTPATIENT)
Dept: UROLOGY | Facility: CLINIC | Age: 84
End: 2024-09-09
Payer: MEDICARE

## 2024-10-03 ENCOUNTER — APPOINTMENT (OUTPATIENT)
Age: 84
End: 2024-10-03
Payer: MEDICARE

## 2024-10-03 VITALS
HEART RATE: 48 BPM | DIASTOLIC BLOOD PRESSURE: 60 MMHG | OXYGEN SATURATION: 98 % | HEIGHT: 62 IN | SYSTOLIC BLOOD PRESSURE: 140 MMHG | BODY MASS INDEX: 25.73 KG/M2 | WEIGHT: 139.8 LBS

## 2024-10-03 DIAGNOSIS — I25.810 CORONARY ARTERY DISEASE INVOLVING AUTOLOGOUS ARTERY CORONARY BYPASS GRAFT WITHOUT ANGINA PECTORIS: ICD-10-CM

## 2024-10-03 DIAGNOSIS — N39.41 URGE INCONTINENCE OF URINE: ICD-10-CM

## 2024-10-03 DIAGNOSIS — G89.29 CHRONIC LOW BACK PAIN, UNSPECIFIED BACK PAIN LATERALITY, UNSPECIFIED WHETHER SCIATICA PRESENT: Primary | ICD-10-CM

## 2024-10-03 DIAGNOSIS — E11.9 TYPE 2 DIABETES MELLITUS WITHOUT COMPLICATION, WITHOUT LONG-TERM CURRENT USE OF INSULIN (MULTI): ICD-10-CM

## 2024-10-03 DIAGNOSIS — F51.01 PRIMARY INSOMNIA: ICD-10-CM

## 2024-10-03 DIAGNOSIS — M54.50 CHRONIC LOW BACK PAIN, UNSPECIFIED BACK PAIN LATERALITY, UNSPECIFIED WHETHER SCIATICA PRESENT: Primary | ICD-10-CM

## 2024-10-03 DIAGNOSIS — I10 PRIMARY HYPERTENSION: ICD-10-CM

## 2024-10-03 PROCEDURE — 3077F SYST BP >= 140 MM HG: CPT | Performed by: FAMILY MEDICINE

## 2024-10-03 PROCEDURE — 1036F TOBACCO NON-USER: CPT | Performed by: FAMILY MEDICINE

## 2024-10-03 PROCEDURE — 99213 OFFICE O/P EST LOW 20 MIN: CPT | Performed by: FAMILY MEDICINE

## 2024-10-03 PROCEDURE — 1157F ADVNC CARE PLAN IN RCRD: CPT | Performed by: FAMILY MEDICINE

## 2024-10-03 PROCEDURE — 1159F MED LIST DOCD IN RCRD: CPT | Performed by: FAMILY MEDICINE

## 2024-10-03 PROCEDURE — 1160F RVW MEDS BY RX/DR IN RCRD: CPT | Performed by: FAMILY MEDICINE

## 2024-10-03 PROCEDURE — 3078F DIAST BP <80 MM HG: CPT | Performed by: FAMILY MEDICINE

## 2024-10-03 RX ORDER — MIRABEGRON 50 MG/1
50 TABLET, FILM COATED, EXTENDED RELEASE ORAL DAILY
Qty: 30 TABLET | Refills: 11 | Status: SHIPPED | OUTPATIENT
Start: 2024-10-03 | End: 2025-10-03

## 2024-10-03 RX ORDER — FUROSEMIDE 40 MG/1
80 TABLET ORAL DAILY
Qty: 60 TABLET | Refills: 11 | Status: SHIPPED | OUTPATIENT
Start: 2024-10-03 | End: 2025-10-03

## 2024-10-03 RX ORDER — METOPROLOL SUCCINATE 50 MG/1
50 TABLET, EXTENDED RELEASE ORAL DAILY
Qty: 30 TABLET | Refills: 11 | Status: SHIPPED | OUTPATIENT
Start: 2024-10-03 | End: 2025-10-03

## 2024-10-03 RX ORDER — AMLODIPINE BESYLATE 5 MG/1
5 TABLET ORAL DAILY
Qty: 30 TABLET | Refills: 11 | Status: SHIPPED | OUTPATIENT
Start: 2024-10-03 | End: 2025-09-28

## 2024-10-03 RX ORDER — LISINOPRIL 40 MG/1
40 TABLET ORAL DAILY
Qty: 30 TABLET | Refills: 11 | Status: SHIPPED | OUTPATIENT
Start: 2024-10-03 | End: 2025-10-03

## 2024-10-03 RX ORDER — ATORVASTATIN CALCIUM 20 MG/1
20 TABLET, FILM COATED ORAL DAILY
Qty: 30 TABLET | Refills: 11 | Status: SHIPPED | OUTPATIENT
Start: 2024-10-03 | End: 2025-10-03

## 2024-10-03 RX ORDER — TRAZODONE HYDROCHLORIDE 50 MG/1
25 TABLET ORAL NIGHTLY
Qty: 15 TABLET | Refills: 11 | Status: SHIPPED | OUTPATIENT
Start: 2024-10-03 | End: 2025-10-03

## 2024-10-03 RX ORDER — METFORMIN HYDROCHLORIDE 750 MG/1
1500 TABLET, EXTENDED RELEASE ORAL DAILY
Qty: 60 TABLET | Refills: 11 | Status: SHIPPED | OUTPATIENT
Start: 2024-10-03 | End: 2025-10-03

## 2024-10-03 ASSESSMENT — ENCOUNTER SYMPTOMS
DIARRHEA: 0
NAUSEA: 0
SHORTNESS OF BREATH: 0
APPETITE CHANGE: 0
PALPITATIONS: 0
VOMITING: 0
CHEST TIGHTNESS: 0
FATIGUE: 0
ABDOMINAL PAIN: 0
CONSTIPATION: 1
ACTIVITY CHANGE: 0
COUGH: 0

## 2024-10-03 NOTE — PATIENT INSTRUCTIONS
Replace labetalol with metoprolol once a day, take with other medicines in AM, you can also use both metformin tablets in the AM with other medicines.  Keep appointment in November.  Call if more palpitations or blood pressures over 140/90

## 2024-10-03 NOTE — ASSESSMENT & PLAN NOTE
Try changing labetalol to metoprolol succinate 50 mg once a day to try to improve convenience for the patient since she is forgetting to take her evening dose of medication.  Monitor blood pressure at home, call if increasing palpitations versus systolic blood pressure above 140.

## 2024-10-03 NOTE — ASSESSMENT & PLAN NOTE
Patient advised that she can take both of her metformin tablets in the morning to try to prevent missing evening dosing.

## 2024-10-03 NOTE — PROGRESS NOTES
"Subjective   Patient ID: Alma Delia Walsh is a 84 y.o. female who presents for Discuss Meds.    HPI   Had L THR in May, surgery did well, completed [facility] PT in June  No headache, chest pain, shortness of breath, dizziness, lightheadedness, or edema  Using a walker for balance, pain improved, uses a cane at home at times  Was having palpitations  Has some constipation, using stool softner in AM      Review of Systems   Constitutional:  Negative for activity change, appetite change and fatigue.   Respiratory:  Negative for cough, chest tightness and shortness of breath.    Cardiovascular:  Negative for chest pain, palpitations and leg swelling.   Gastrointestinal:  Positive for constipation. Negative for abdominal pain, diarrhea, nausea and vomiting.       Objective   /60   Pulse (!) 48   Ht 1.575 m (5' 2\")   Wt 63.4 kg (139 lb 12.8 oz)   SpO2 98%   BMI 25.57 kg/m²     Physical Exam  Vitals and nursing note reviewed.   Constitutional:       Appearance: Normal appearance.   HENT:      Head: Normocephalic and atraumatic.      Right Ear: Tympanic membrane, ear canal and external ear normal.      Left Ear: Tympanic membrane, ear canal and external ear normal.      Nose: Nose normal.      Mouth/Throat:      Mouth: Mucous membranes are moist.      Pharynx: Oropharynx is clear.   Cardiovascular:      Rate and Rhythm: Normal rate and regular rhythm.      Pulses: Normal pulses.      Heart sounds: Normal heart sounds.   Pulmonary:      Effort: Pulmonary effort is normal.      Breath sounds: Normal breath sounds.   Musculoskeletal:      Cervical back: Normal range of motion and neck supple.   Neurological:      Mental Status: She is alert.   Psychiatric:         Mood and Affect: Mood normal.         Behavior: Behavior normal.         Assessment/Plan   Problem List Items Addressed This Visit             ICD-10-CM    Coronary artery disease involving autologous artery coronary bypass graft without angina pectoris I25.810 "    Relevant Medications    amLODIPine (Norvasc) 5 mg tablet    atorvastatin (Lipitor) 20 mg tablet    furosemide (Lasix) 40 mg tablet    lisinopril 40 mg tablet    metoprolol succinate XL (Toprol-XL) 50 mg 24 hr tablet    Diabetes mellitus (Multi) E11.9     Patient advised that she can take both of her metformin tablets in the morning to try to prevent missing evening dosing.         Relevant Medications    metFORMIN XR (Glucophage-XR) 750 mg 24 hr tablet    Hypertension I10     Try changing labetalol to metoprolol succinate 50 mg once a day to try to improve convenience for the patient since she is forgetting to take her evening dose of medication.  Monitor blood pressure at home, call if increasing palpitations versus systolic blood pressure above 140.         Relevant Medications    amLODIPine (Norvasc) 5 mg tablet    furosemide (Lasix) 40 mg tablet    lisinopril 40 mg tablet    metoprolol succinate XL (Toprol-XL) 50 mg 24 hr tablet    Low back pain - Primary M54.50     Did well with physical therapy at home, progressing from walker to a cane, no recent falls.         Urge incontinence of urine N39.41    Relevant Medications    mirabegron (Myrbetriq) 50 mg tablet extended release 24 hr 24 hr tablet    Primary insomnia F51.01    Relevant Medications    traZODone (Desyrel) 50 mg tablet

## 2024-11-15 ENCOUNTER — LAB (OUTPATIENT)
Facility: LAB | Age: 84
End: 2024-11-15
Payer: MEDICARE

## 2024-11-15 DIAGNOSIS — I10 PRIMARY HYPERTENSION: ICD-10-CM

## 2024-11-15 DIAGNOSIS — E11.9 TYPE 2 DIABETES MELLITUS WITHOUT COMPLICATION, WITHOUT LONG-TERM CURRENT USE OF INSULIN (MULTI): ICD-10-CM

## 2024-11-15 DIAGNOSIS — E78.2 MIXED HYPERLIPIDEMIA: ICD-10-CM

## 2024-11-15 LAB
ALBUMIN SERPL BCP-MCNC: 4.4 G/DL (ref 3.4–5)
ALP SERPL-CCNC: 89 U/L (ref 33–136)
ALT SERPL W P-5'-P-CCNC: 14 U/L (ref 7–45)
ANION GAP SERPL CALC-SCNC: 14 MMOL/L (ref 10–20)
AST SERPL W P-5'-P-CCNC: 19 U/L (ref 9–39)
BILIRUB SERPL-MCNC: 0.5 MG/DL (ref 0–1.2)
BUN SERPL-MCNC: 35 MG/DL (ref 6–23)
CALCIUM SERPL-MCNC: 9.8 MG/DL (ref 8.6–10.3)
CHLORIDE SERPL-SCNC: 101 MMOL/L (ref 98–107)
CO2 SERPL-SCNC: 32 MMOL/L (ref 21–32)
CREAT SERPL-MCNC: 1.34 MG/DL (ref 0.5–1.05)
EGFRCR SERPLBLD CKD-EPI 2021: 40 ML/MIN/1.73M*2
EST. AVERAGE GLUCOSE BLD GHB EST-MCNC: 126 MG/DL
GLUCOSE SERPL-MCNC: 102 MG/DL (ref 74–99)
HBA1C MFR BLD: 6 %
LDLC SERPL DIRECT ASSAY-MCNC: 84 MG/DL (ref 0–129)
POTASSIUM SERPL-SCNC: 4.9 MMOL/L (ref 3.5–5.3)
PROT SERPL-MCNC: 7.4 G/DL (ref 6.4–8.2)
SODIUM SERPL-SCNC: 142 MMOL/L (ref 136–145)

## 2024-11-15 PROCEDURE — 80053 COMPREHEN METABOLIC PANEL: CPT

## 2024-11-15 PROCEDURE — 83721 ASSAY OF BLOOD LIPOPROTEIN: CPT

## 2024-11-15 PROCEDURE — 36415 COLL VENOUS BLD VENIPUNCTURE: CPT

## 2024-11-15 PROCEDURE — 83036 HEMOGLOBIN GLYCOSYLATED A1C: CPT

## 2024-11-21 ENCOUNTER — APPOINTMENT (OUTPATIENT)
Dept: PRIMARY CARE | Facility: CLINIC | Age: 84
End: 2024-11-21
Payer: MEDICARE

## 2024-11-21 VITALS
DIASTOLIC BLOOD PRESSURE: 80 MMHG | HEART RATE: 67 BPM | OXYGEN SATURATION: 99 % | BODY MASS INDEX: 25.8 KG/M2 | HEIGHT: 62 IN | SYSTOLIC BLOOD PRESSURE: 140 MMHG | WEIGHT: 140.2 LBS

## 2024-11-21 DIAGNOSIS — E78.2 MIXED HYPERLIPIDEMIA: ICD-10-CM

## 2024-11-21 DIAGNOSIS — Z00.00 ROUTINE GENERAL MEDICAL EXAMINATION AT HEALTH CARE FACILITY: ICD-10-CM

## 2024-11-21 DIAGNOSIS — I25.810 CORONARY ARTERY DISEASE INVOLVING AUTOLOGOUS ARTERY CORONARY BYPASS GRAFT WITHOUT ANGINA PECTORIS: ICD-10-CM

## 2024-11-21 DIAGNOSIS — N39.41 URGE INCONTINENCE OF URINE: ICD-10-CM

## 2024-11-21 DIAGNOSIS — E53.8 VITAMIN B12 DEFICIENCY: ICD-10-CM

## 2024-11-21 DIAGNOSIS — E11.9 TYPE 2 DIABETES MELLITUS WITHOUT COMPLICATION, WITHOUT LONG-TERM CURRENT USE OF INSULIN (MULTI): Primary | ICD-10-CM

## 2024-11-21 DIAGNOSIS — F33.0 MILD EPISODE OF RECURRENT MAJOR DEPRESSIVE DISORDER (CMS-HCC): ICD-10-CM

## 2024-11-21 DIAGNOSIS — R00.2 PALPITATIONS: ICD-10-CM

## 2024-11-21 DIAGNOSIS — M06.9 RHEUMATOID ARTHRITIS, INVOLVING UNSPECIFIED SITE, UNSPECIFIED WHETHER RHEUMATOID FACTOR PRESENT (MULTI): ICD-10-CM

## 2024-11-21 DIAGNOSIS — M1A.30X0 CHRONIC GOUT DUE TO RENAL IMPAIRMENT WITHOUT TOPHUS, UNSPECIFIED SITE: ICD-10-CM

## 2024-11-21 DIAGNOSIS — F51.01 PRIMARY INSOMNIA: ICD-10-CM

## 2024-11-21 DIAGNOSIS — I10 PRIMARY HYPERTENSION: ICD-10-CM

## 2024-11-21 PROBLEM — R23.8 SKIN BREAKDOWN: Status: RESOLVED | Noted: 2023-02-24 | Resolved: 2024-11-21

## 2024-11-21 PROCEDURE — 3079F DIAST BP 80-89 MM HG: CPT | Performed by: FAMILY MEDICINE

## 2024-11-21 PROCEDURE — 1159F MED LIST DOCD IN RCRD: CPT | Performed by: FAMILY MEDICINE

## 2024-11-21 PROCEDURE — 1157F ADVNC CARE PLAN IN RCRD: CPT | Performed by: FAMILY MEDICINE

## 2024-11-21 PROCEDURE — 1123F ACP DISCUSS/DSCN MKR DOCD: CPT | Performed by: FAMILY MEDICINE

## 2024-11-21 PROCEDURE — 1160F RVW MEDS BY RX/DR IN RCRD: CPT | Performed by: FAMILY MEDICINE

## 2024-11-21 PROCEDURE — 3077F SYST BP >= 140 MM HG: CPT | Performed by: FAMILY MEDICINE

## 2024-11-21 PROCEDURE — 1158F ADVNC CARE PLAN TLK DOCD: CPT | Performed by: FAMILY MEDICINE

## 2024-11-21 PROCEDURE — 90673 RIV3 VACCINE NO PRESERV IM: CPT | Performed by: FAMILY MEDICINE

## 2024-11-21 PROCEDURE — 99214 OFFICE O/P EST MOD 30 MIN: CPT | Performed by: FAMILY MEDICINE

## 2024-11-21 PROCEDURE — G0008 ADMIN INFLUENZA VIRUS VAC: HCPCS | Performed by: FAMILY MEDICINE

## 2024-11-21 PROCEDURE — G2211 COMPLEX E/M VISIT ADD ON: HCPCS | Performed by: FAMILY MEDICINE

## 2024-11-21 PROCEDURE — 1036F TOBACCO NON-USER: CPT | Performed by: FAMILY MEDICINE

## 2024-11-21 ASSESSMENT — ENCOUNTER SYMPTOMS
SLEEP DISTURBANCE: 0
PALPITATIONS: 0
DIFFICULTY URINATING: 0
RHINORRHEA: 0
UNEXPECTED WEIGHT CHANGE: 0
VOMITING: 0
FATIGUE: 1
CONSTIPATION: 0
DIZZINESS: 0
DIARRHEA: 0
ABDOMINAL DISTENTION: 0
HEADACHES: 0
LIGHT-HEADEDNESS: 0
APPETITE CHANGE: 0
DYSPHORIC MOOD: 1
ADENOPATHY: 0
BACK PAIN: 1
ACTIVITY CHANGE: 0
NERVOUS/ANXIOUS: 0
ARTHRALGIAS: 0
ABDOMINAL PAIN: 0
NAUSEA: 0
COUGH: 0
SHORTNESS OF BREATH: 0
WHEEZING: 0
NUMBNESS: 0
TROUBLE SWALLOWING: 0

## 2024-11-21 NOTE — PROGRESS NOTES
"Subjective   Patient ID: Alma Delia Walsh is a 84 y.o. female who presents for 6 MO LABS.    HPI   No headache, chest pain (heavy at times), shortness of breath, dizziness, lightheadedness, or edema  No low blood sugars since last OV, seen opthalmology in the past year, and no numbness or tingling in feet, skin normal.  Uses cane when out of home/uses a walker at times, no falls in the past month, no hip pain, no radicular pain, some LBP  Some constipation at times, uses stool softners, using some fiber  Was in ER earlier this month with an irritation at umbilicus  Pain in right shoulder/arm  Uses APAP as needed for pain  Some urge to get to the toilet, uses a pad/depend  No more vertigo/dizziness  Uses 1/2 trazodone at night with help  Has help from family at home, son lives with patient    Review of Systems   Constitutional:  Positive for fatigue. Negative for activity change, appetite change and unexpected weight change.   HENT:  Negative for ear pain, nosebleeds, rhinorrhea, sneezing and trouble swallowing.    Respiratory:  Negative for cough, shortness of breath and wheezing.    Cardiovascular:  Negative for chest pain, palpitations and leg swelling.   Gastrointestinal:  Negative for abdominal distention, abdominal pain, constipation, diarrhea, nausea and vomiting.   Genitourinary:  Negative for difficulty urinating.   Musculoskeletal:  Positive for back pain and gait problem. Negative for arthralgias.   Skin:  Negative for rash.   Neurological:  Negative for dizziness, light-headedness, numbness and headaches.   Hematological:  Negative for adenopathy.   Psychiatric/Behavioral:  Positive for dysphoric mood. Negative for behavioral problems and sleep disturbance. The patient is not nervous/anxious.    All other systems reviewed and are negative.      Objective   /80   Pulse 67   Ht 1.575 m (5' 2\")   Wt 63.6 kg (140 lb 3.2 oz)   SpO2 99%   BMI 25.64 kg/m²     Physical Exam  Vitals and nursing note " reviewed.   Constitutional:       Appearance: Normal appearance.   HENT:      Head: Normocephalic and atraumatic.      Right Ear: Tympanic membrane, ear canal and external ear normal.      Left Ear: Tympanic membrane, ear canal and external ear normal.      Nose: Nose normal.      Mouth/Throat:      Mouth: Mucous membranes are moist.      Pharynx: Oropharynx is clear.   Cardiovascular:      Rate and Rhythm: Normal rate and regular rhythm.      Pulses: Normal pulses.      Heart sounds: Normal heart sounds.   Pulmonary:      Effort: Pulmonary effort is normal.      Breath sounds: Normal breath sounds.   Musculoskeletal:      Cervical back: Normal range of motion and neck supple.   Neurological:      Mental Status: She is alert.   Psychiatric:         Mood and Affect: Mood normal.         Behavior: Behavior normal.         Assessment/Plan   Problem List Items Addressed This Visit             ICD-10-CM    Coronary artery disease involving autologous artery coronary bypass graft without angina pectoris I25.810     Follows with cardiology at least annually, no change.         Relevant Orders    Follow Up In Primary Care - Established    Depression F32.A     Stable with medication.         Relevant Orders    Follow Up In Primary Care - Established    Diabetes mellitus (Multi) - Primary E11.9     A1c testing stable, tolerating medication no change.         Relevant Orders    Follow Up In Primary Care - Established    Albumin-Creatinine Ratio, Urine Random    Comprehensive Metabolic Panel    Hemoglobin A1C    Thyroid Stimulating Hormone    Lipid Panel    Gout M10.9    Relevant Orders    Follow Up In Primary Care - Established    Comprehensive Metabolic Panel    CBC and Auto Differential    Uric Acid    Hyperlipidemia E78.5     Tolerating atorvastatin, no change.         Relevant Orders    Follow Up In Primary Care - Established    Comprehensive Metabolic Panel    Lipid Panel    Hypertension I10     Blood pressure stable,  renal function stable, no change.         Relevant Orders    Follow Up In Primary Care - Established    Albumin-Creatinine Ratio, Urine Random    Comprehensive Metabolic Panel    Arthritis or polyarthritis, rheumatoid M06.9    Relevant Orders    Follow Up In Primary Care - Established    Urge incontinence of urine N39.41    Relevant Orders    Follow Up In Primary Care - Established    Vitamin B12 deficiency E53.8    Relevant Orders    Follow Up In Primary Care - Established    CBC and Auto Differential    Vitamin B12    Palpitations R00.2    Relevant Orders    Follow Up In Primary Care - Established    Primary insomnia F51.01    Relevant Orders    Follow Up In Primary Care - Established     Other Visit Diagnoses         Codes    Routine general medical examination at health care facility     Z00.00    Relevant Orders    Follow Up In Primary Care - Established

## 2025-01-12 NOTE — PROGRESS NOTES
Subjective   Patient ID: Alma Delia Walsh is a 84 y.o. female    HPI  84 y.o. female who presents for a 1 year follow-up visit. The patient has a chronic history of incontinence and lower urinary tract symptoms (LUTs) which are chronic and mild, including some frequency and urgency but denies dysuria and hematuria. The patient experiences nocturia approximately once per night. It is noted that caffeine intake exacerbates the LUTs. The patient is currently on Myrbetriq 25 mg for LUTs. There is a history of kidney stones, with the last symptoms occurring many years ago, and the patient has only one functioning kidney (right kidney). Additionally, there is a history of urinary tract infections (UTIs), with the last positive urine culture in April 2022.      Review of Systems    All systems were reviewed. Anything negative was noted in the HPI.    Objective   Physical Exam    General: Well developed, well nourished, alert and cooperative, appears in no acute distress   Eyes: Non-injected conjunctiva, sclera clear, no proptosis   Cardiac: Extremities are warm and well perfused. No edema, cyanosis or pallor   Lungs: Breathing is easy, non-labored. Speaking in clear and complete sentences. Normal diaphragmatic movement   MSK: Ambulatory with steady gait, unassisted   Neuro: Alert and oriented to person, place, and time   Psych: Demonstrates good judgment and reason, without hallucinations, abnormal affect or abnormal behaviors   Skin: No obvious lesions, no rashes       No CVA tenderness bilaterally   No suprapubic pain or discomfort       No past medical history on file.      Past Surgical History:   Procedure Laterality Date    OTHER SURGICAL HISTORY  12/30/2019    Colonoscopy    OTHER SURGICAL HISTORY  12/30/2019    Nail avulsion procedure    OTHER SURGICAL HISTORY  12/30/2019    Colonic polypectomy    OTHER SURGICAL HISTORY  12/30/2019    Appendectomy    OTHER SURGICAL HISTORY  12/30/2019    Dilation and curettage     OTHER SURGICAL HISTORY  12/30/2019    Hysterectomy vaginal    OTHER SURGICAL HISTORY  12/30/2019    Nephrectomy    OTHER SURGICAL HISTORY  12/30/2019    Endoscopy    OTHER SURGICAL HISTORY  10/19/2021    Coronary artery bypass graft    OTHER SURGICAL HISTORY  01/07/2020    Cholecystectomy    OTHER SURGICAL HISTORY  01/07/2020    Ovarian cystectomy         Assessment/Plan   Chronic mild lower urinary tract symptoms, history of kidney stones, and single kidney status    84 y.o. female who presents for the above condition, We had a very long and extensive discussion with the patient regarding the pathophysiology, differential diagnosis, risk factor, management, natural history, incidence and diagnostic work-up of the condition.     - Advised pt to avoid caffeine  - Advised pt to stop fluid intake at 5 pm    Plan:  - Follow-up in 1 year  - Refill Myrbetriq 50 mg once daily    E&M visit today is associated with current or anticipated ongoing medical care services related to a patient's single, serious condition or a complex condition.     1/13/2025    Scribe Attestation  By signing my name below, IPriscilla Scribe attest that this documentation has been prepared under the direction and in the presence of Dr. Silvino Miller.

## 2025-01-13 ENCOUNTER — APPOINTMENT (OUTPATIENT)
Dept: UROLOGY | Facility: CLINIC | Age: 85
End: 2025-01-13
Payer: MEDICARE

## 2025-01-13 VITALS
WEIGHT: 143 LBS | SYSTOLIC BLOOD PRESSURE: 148 MMHG | DIASTOLIC BLOOD PRESSURE: 77 MMHG | HEART RATE: 76 BPM | HEIGHT: 62 IN | BODY MASS INDEX: 26.31 KG/M2

## 2025-01-13 DIAGNOSIS — N39.41 URGE INCONTINENCE OF URINE: ICD-10-CM

## 2025-01-13 PROCEDURE — 3078F DIAST BP <80 MM HG: CPT | Performed by: STUDENT IN AN ORGANIZED HEALTH CARE EDUCATION/TRAINING PROGRAM

## 2025-01-13 PROCEDURE — G2211 COMPLEX E/M VISIT ADD ON: HCPCS | Performed by: STUDENT IN AN ORGANIZED HEALTH CARE EDUCATION/TRAINING PROGRAM

## 2025-01-13 PROCEDURE — 1159F MED LIST DOCD IN RCRD: CPT | Performed by: STUDENT IN AN ORGANIZED HEALTH CARE EDUCATION/TRAINING PROGRAM

## 2025-01-13 PROCEDURE — 99214 OFFICE O/P EST MOD 30 MIN: CPT | Performed by: STUDENT IN AN ORGANIZED HEALTH CARE EDUCATION/TRAINING PROGRAM

## 2025-01-13 PROCEDURE — 1157F ADVNC CARE PLAN IN RCRD: CPT | Performed by: STUDENT IN AN ORGANIZED HEALTH CARE EDUCATION/TRAINING PROGRAM

## 2025-01-13 PROCEDURE — 3077F SYST BP >= 140 MM HG: CPT | Performed by: STUDENT IN AN ORGANIZED HEALTH CARE EDUCATION/TRAINING PROGRAM

## 2025-01-13 RX ORDER — ISOSORBIDE MONONITRATE 30 MG/1
TABLET, EXTENDED RELEASE ORAL
COMMUNITY

## 2025-01-13 RX ORDER — MIRABEGRON 50 MG/1
50 TABLET, FILM COATED, EXTENDED RELEASE ORAL DAILY
Qty: 30 TABLET | Refills: 11 | Status: SHIPPED | OUTPATIENT
Start: 2025-01-13 | End: 2026-01-13

## 2025-01-13 ASSESSMENT — ENCOUNTER SYMPTOMS
OCCASIONAL FEELINGS OF UNSTEADINESS: 0
DEPRESSION: 0
LOSS OF SENSATION IN FEET: 0

## 2025-05-22 ENCOUNTER — LAB (OUTPATIENT)
Dept: LAB | Facility: HOSPITAL | Age: 85
End: 2025-05-22
Payer: MEDICARE

## 2025-05-22 ENCOUNTER — APPOINTMENT (OUTPATIENT)
Age: 85
End: 2025-05-22
Payer: MEDICARE

## 2025-05-22 DIAGNOSIS — E11.9 TYPE 2 DIABETES MELLITUS WITHOUT COMPLICATION, WITHOUT LONG-TERM CURRENT USE OF INSULIN: ICD-10-CM

## 2025-05-22 DIAGNOSIS — E53.8 VITAMIN B12 DEFICIENCY: ICD-10-CM

## 2025-05-22 DIAGNOSIS — I10 PRIMARY HYPERTENSION: ICD-10-CM

## 2025-05-22 DIAGNOSIS — E78.2 MIXED HYPERLIPIDEMIA: ICD-10-CM

## 2025-05-22 DIAGNOSIS — M1A.30X0 CHRONIC GOUT DUE TO RENAL IMPAIRMENT WITHOUT TOPHUS, UNSPECIFIED SITE: ICD-10-CM

## 2025-05-22 LAB
ALBUMIN SERPL BCP-MCNC: 4.4 G/DL (ref 3.4–5)
ALP SERPL-CCNC: 74 U/L (ref 33–136)
ALT SERPL W P-5'-P-CCNC: 13 U/L (ref 7–45)
ANION GAP SERPL CALC-SCNC: 13 MMOL/L (ref 10–20)
AST SERPL W P-5'-P-CCNC: 16 U/L (ref 9–39)
BASOPHILS # BLD AUTO: 0.04 X10*3/UL (ref 0–0.1)
BASOPHILS NFR BLD AUTO: 0.5 %
BILIRUB SERPL-MCNC: 0.4 MG/DL (ref 0–1.2)
BUN SERPL-MCNC: 31 MG/DL (ref 6–23)
CALCIUM SERPL-MCNC: 9.7 MG/DL (ref 8.6–10.3)
CHLORIDE SERPL-SCNC: 106 MMOL/L (ref 98–107)
CHOLEST SERPL-MCNC: 207 MG/DL (ref 0–199)
CHOLESTEROL/HDL RATIO: 3.6
CO2 SERPL-SCNC: 28 MMOL/L (ref 21–32)
CREAT SERPL-MCNC: 1.11 MG/DL (ref 0.5–1.05)
EGFRCR SERPLBLD CKD-EPI 2021: 50 ML/MIN/1.73M*2
EOSINOPHIL # BLD AUTO: 0.36 X10*3/UL (ref 0–0.4)
EOSINOPHIL NFR BLD AUTO: 4.2 %
ERYTHROCYTE [DISTWIDTH] IN BLOOD BY AUTOMATED COUNT: 13.4 % (ref 11.5–14.5)
GLUCOSE SERPL-MCNC: 121 MG/DL (ref 74–99)
HCT VFR BLD AUTO: 40.1 % (ref 36–46)
HDLC SERPL-MCNC: 57 MG/DL
HGB BLD-MCNC: 12.5 G/DL (ref 12–16)
IMM GRANULOCYTES # BLD AUTO: 0.02 X10*3/UL (ref 0–0.5)
IMM GRANULOCYTES NFR BLD AUTO: 0.2 % (ref 0–0.9)
LDLC SERPL CALC-MCNC: 117 MG/DL
LYMPHOCYTES # BLD AUTO: 1.46 X10*3/UL (ref 0.8–3)
LYMPHOCYTES NFR BLD AUTO: 17.1 %
MCH RBC QN AUTO: 29.1 PG (ref 26–34)
MCHC RBC AUTO-ENTMCNC: 31.2 G/DL (ref 32–36)
MCV RBC AUTO: 93 FL (ref 80–100)
MONOCYTES # BLD AUTO: 0.46 X10*3/UL (ref 0.05–0.8)
MONOCYTES NFR BLD AUTO: 5.4 %
NEUTROPHILS # BLD AUTO: 6.21 X10*3/UL (ref 1.6–5.5)
NEUTROPHILS NFR BLD AUTO: 72.6 %
NON HDL CHOLESTEROL: 150 MG/DL (ref 0–149)
NRBC BLD-RTO: 0 /100 WBCS (ref 0–0)
PLATELET # BLD AUTO: 284 X10*3/UL (ref 150–450)
POTASSIUM SERPL-SCNC: 4.8 MMOL/L (ref 3.5–5.3)
PROT SERPL-MCNC: 7.2 G/DL (ref 6.4–8.2)
RBC # BLD AUTO: 4.3 X10*6/UL (ref 4–5.2)
SODIUM SERPL-SCNC: 142 MMOL/L (ref 136–145)
TRIGL SERPL-MCNC: 166 MG/DL (ref 0–149)
TSH SERPL-ACNC: 4.61 MIU/L (ref 0.44–3.98)
URATE SERPL-MCNC: 8.2 MG/DL (ref 2.3–6.7)
VLDL: 33 MG/DL (ref 0–40)
WBC # BLD AUTO: 8.6 X10*3/UL (ref 4.4–11.3)

## 2025-05-22 PROCEDURE — 82570 ASSAY OF URINE CREATININE: CPT

## 2025-05-22 PROCEDURE — 84443 ASSAY THYROID STIM HORMONE: CPT

## 2025-05-22 PROCEDURE — 82607 VITAMIN B-12: CPT

## 2025-05-22 PROCEDURE — 80061 LIPID PANEL: CPT

## 2025-05-22 PROCEDURE — 83036 HEMOGLOBIN GLYCOSYLATED A1C: CPT

## 2025-05-22 PROCEDURE — 84550 ASSAY OF BLOOD/URIC ACID: CPT

## 2025-05-22 PROCEDURE — 82043 UR ALBUMIN QUANTITATIVE: CPT

## 2025-05-22 PROCEDURE — 85025 COMPLETE CBC W/AUTO DIFF WBC: CPT

## 2025-05-22 PROCEDURE — 80053 COMPREHEN METABOLIC PANEL: CPT

## 2025-05-23 LAB
CREAT UR-MCNC: 130.1 MG/DL (ref 20–320)
EST. AVERAGE GLUCOSE BLD GHB EST-MCNC: 126 MG/DL
HBA1C MFR BLD: 6 % (ref ?–5.7)
MICROALBUMIN UR-MCNC: 98.9 MG/L
MICROALBUMIN/CREAT UR: 76 UG/MG CREAT
VIT B12 SERPL-MCNC: >2000 PG/ML (ref 211–911)

## 2025-05-27 ENCOUNTER — TELEPHONE (OUTPATIENT)
Age: 85
End: 2025-05-27
Payer: MEDICARE

## 2025-05-27 NOTE — TELEPHONE ENCOUNTER
[facility] CALLED TO REPORT PATIENT CALLED THEIR OFFICE STATING SHE HAD PALPITATIONS. CARDIOLOGIST OFFICE VIEWED LABS ORDERED BY THIS OFFICE AND SAW THYROID LEVEL WAS ELEVATED. PATIENT HAS APPOINTMENT 5/30/25. PLEASE ADVISE.

## 2025-05-27 NOTE — TELEPHONE ENCOUNTER
TSH was only mildly abnormal, she is also seen cardiology in the past for paroxysmal atrial fibrillation, I be happy to talk through her issues that she has at her upcoming appointment.

## 2025-05-30 ENCOUNTER — APPOINTMENT (OUTPATIENT)
Age: 85
End: 2025-05-30
Payer: MEDICARE

## 2025-05-30 VITALS
HEIGHT: 62 IN | WEIGHT: 150.8 LBS | DIASTOLIC BLOOD PRESSURE: 80 MMHG | BODY MASS INDEX: 27.75 KG/M2 | SYSTOLIC BLOOD PRESSURE: 130 MMHG | HEART RATE: 68 BPM | OXYGEN SATURATION: 97 %

## 2025-05-30 DIAGNOSIS — F33.0 MILD EPISODE OF RECURRENT MAJOR DEPRESSIVE DISORDER: ICD-10-CM

## 2025-05-30 DIAGNOSIS — M06.9 RHEUMATOID ARTHRITIS, INVOLVING UNSPECIFIED SITE, UNSPECIFIED WHETHER RHEUMATOID FACTOR PRESENT (MULTI): ICD-10-CM

## 2025-05-30 DIAGNOSIS — N18.31 CHRONIC KIDNEY DISEASE, STAGE 3A (MULTI): ICD-10-CM

## 2025-05-30 DIAGNOSIS — Z00.00 ROUTINE GENERAL MEDICAL EXAMINATION AT HEALTH CARE FACILITY: Primary | ICD-10-CM

## 2025-05-30 DIAGNOSIS — R00.2 PALPITATIONS: ICD-10-CM

## 2025-05-30 DIAGNOSIS — E53.8 VITAMIN B12 DEFICIENCY: ICD-10-CM

## 2025-05-30 DIAGNOSIS — I48.0 PAF (PAROXYSMAL ATRIAL FIBRILLATION) (MULTI): ICD-10-CM

## 2025-05-30 DIAGNOSIS — I10 PRIMARY HYPERTENSION: ICD-10-CM

## 2025-05-30 DIAGNOSIS — E11.9 TYPE 2 DIABETES MELLITUS WITHOUT COMPLICATION, WITHOUT LONG-TERM CURRENT USE OF INSULIN: ICD-10-CM

## 2025-05-30 DIAGNOSIS — N39.41 URGE INCONTINENCE OF URINE: ICD-10-CM

## 2025-05-30 DIAGNOSIS — F51.01 PRIMARY INSOMNIA: ICD-10-CM

## 2025-05-30 DIAGNOSIS — M1A.30X0 CHRONIC GOUT DUE TO RENAL IMPAIRMENT WITHOUT TOPHUS, UNSPECIFIED SITE: ICD-10-CM

## 2025-05-30 DIAGNOSIS — E78.2 DM TYPE 2 WITH DIABETIC MIXED HYPERLIPIDEMIA (MULTI): ICD-10-CM

## 2025-05-30 DIAGNOSIS — E11.69 DM TYPE 2 WITH DIABETIC MIXED HYPERLIPIDEMIA (MULTI): ICD-10-CM

## 2025-05-30 DIAGNOSIS — E78.2 MIXED HYPERLIPIDEMIA: ICD-10-CM

## 2025-05-30 DIAGNOSIS — I25.810 CORONARY ARTERY DISEASE INVOLVING AUTOLOGOUS ARTERY CORONARY BYPASS GRAFT WITHOUT ANGINA PECTORIS: ICD-10-CM

## 2025-05-30 PROCEDURE — 1170F FXNL STATUS ASSESSED: CPT | Performed by: FAMILY MEDICINE

## 2025-05-30 PROCEDURE — G0439 PPPS, SUBSEQ VISIT: HCPCS | Performed by: FAMILY MEDICINE

## 2025-05-30 PROCEDURE — 1158F ADVNC CARE PLAN TLK DOCD: CPT | Performed by: FAMILY MEDICINE

## 2025-05-30 PROCEDURE — 1159F MED LIST DOCD IN RCRD: CPT | Performed by: FAMILY MEDICINE

## 2025-05-30 PROCEDURE — 99214 OFFICE O/P EST MOD 30 MIN: CPT | Performed by: FAMILY MEDICINE

## 2025-05-30 PROCEDURE — 1160F RVW MEDS BY RX/DR IN RCRD: CPT | Performed by: FAMILY MEDICINE

## 2025-05-30 PROCEDURE — 3075F SYST BP GE 130 - 139MM HG: CPT | Performed by: FAMILY MEDICINE

## 2025-05-30 PROCEDURE — 3079F DIAST BP 80-89 MM HG: CPT | Performed by: FAMILY MEDICINE

## 2025-05-30 PROCEDURE — 1123F ACP DISCUSS/DSCN MKR DOCD: CPT | Performed by: FAMILY MEDICINE

## 2025-05-30 PROCEDURE — 1036F TOBACCO NON-USER: CPT | Performed by: FAMILY MEDICINE

## 2025-05-30 RX ORDER — SERTRALINE HYDROCHLORIDE 100 MG/1
100 TABLET, FILM COATED ORAL DAILY
Qty: 30 TABLET | Refills: 11 | Status: SHIPPED | OUTPATIENT
Start: 2025-05-30 | End: 2026-05-30

## 2025-05-30 RX ORDER — APIXABAN 2.5 MG/1
2.5 TABLET, FILM COATED ORAL 2 TIMES DAILY
COMMUNITY

## 2025-05-30 RX ORDER — AMLODIPINE BESYLATE 5 MG/1
5 TABLET ORAL DAILY
Qty: 30 TABLET | Refills: 11 | Status: SHIPPED | OUTPATIENT
Start: 2025-05-30 | End: 2026-05-25

## 2025-05-30 ASSESSMENT — ENCOUNTER SYMPTOMS
VOMITING: 0
DYSPHORIC MOOD: 1
CONSTIPATION: 0
NERVOUS/ANXIOUS: 0
LIGHT-HEADEDNESS: 0
HEADACHES: 0
NUMBNESS: 0
NAUSEA: 0
DEPRESSION: 1
TROUBLE SWALLOWING: 0
FATIGUE: 0
PALPITATIONS: 1
UNEXPECTED WEIGHT CHANGE: 0
WHEEZING: 0
ARTHRALGIAS: 1
LOSS OF SENSATION IN FEET: 0
SHORTNESS OF BREATH: 0
DIARRHEA: 0
COUGH: 0
OCCASIONAL FEELINGS OF UNSTEADINESS: 1
ABDOMINAL PAIN: 0
ABDOMINAL DISTENTION: 0
APPETITE CHANGE: 0
ADENOPATHY: 0
ACTIVITY CHANGE: 0
RHINORRHEA: 0
DIZZINESS: 0
SLEEP DISTURBANCE: 0
FREQUENCY: 1
DIFFICULTY URINATING: 0

## 2025-05-30 ASSESSMENT — PATIENT HEALTH QUESTIONNAIRE - PHQ9
7. TROUBLE CONCENTRATING ON THINGS, SUCH AS READING THE NEWSPAPER OR WATCHING TELEVISION: NOT AT ALL
6. FEELING BAD ABOUT YOURSELF - OR THAT YOU ARE A FAILURE OR HAVE LET YOURSELF OR YOUR FAMILY DOWN: NOT AT ALL
2. FEELING DOWN, DEPRESSED OR HOPELESS: MORE THAN HALF THE DAYS
8. MOVING OR SPEAKING SO SLOWLY THAT OTHER PEOPLE COULD HAVE NOTICED. OR THE OPPOSITE, BEING SO FIGETY OR RESTLESS THAT YOU HAVE BEEN MOVING AROUND A LOT MORE THAN USUAL: SEVERAL DAYS
SUM OF ALL RESPONSES TO PHQ9 QUESTIONS 1 AND 2: 5
9. THOUGHTS THAT YOU WOULD BE BETTER OFF DEAD, OR OF HURTING YOURSELF: NOT AT ALL
4. FEELING TIRED OR HAVING LITTLE ENERGY: SEVERAL DAYS
1. LITTLE INTEREST OR PLEASURE IN DOING THINGS: NEARLY EVERY DAY
3. TROUBLE FALLING OR STAYING ASLEEP OR SLEEPING TOO MUCH: SEVERAL DAYS
5. POOR APPETITE OR OVEREATING: SEVERAL DAYS
SUM OF ALL RESPONSES TO PHQ QUESTIONS 1-9: 9

## 2025-05-30 ASSESSMENT — ACTIVITIES OF DAILY LIVING (ADL)
BATHING: INDEPENDENT
DOING_HOUSEWORK: NEEDS ASSISTANCE
MANAGING_FINANCES: NEEDS ASSISTANCE
DRESSING: INDEPENDENT
TAKING_MEDICATION: INDEPENDENT
GROCERY_SHOPPING: NEEDS ASSISTANCE

## 2025-05-30 NOTE — ASSESSMENT & PLAN NOTE
Some increase frequency of palpitations, advised about Valsalva maneuver, could take extra metoprolol if needed.  Orders:    Follow Up In Primary Care - Established    Follow Up In Primary Care - Established; Future

## 2025-05-30 NOTE — ASSESSMENT & PLAN NOTE
Continue with trazodone at night  Orders:    Follow Up In Primary Care - Established    Follow Up In Primary Care - Established; Future

## 2025-05-30 NOTE — PATIENT INSTRUCTIONS
If you have palpitations, try to bear down, take a drink of ice water of hold your breath for a short time.  If this does not help, use an extra 1/2 of your metoprolol.

## 2025-05-30 NOTE — ASSESSMENT & PLAN NOTE
Uses a walker or cane, pain is stable.  Orders:    Follow Up In Primary Care - Established    Follow Up In Primary Care - Established; Future

## 2025-05-30 NOTE — ASSESSMENT & PLAN NOTE
A1c testing stable, continue with current medication  Orders:    Follow Up In Primary Care - Established    Follow Up In Primary Care - Established; Future    Cholesterol, LDL Direct; Future    Comprehensive Metabolic Panel; Future    Hemoglobin A1C; Future

## 2025-05-30 NOTE — ASSESSMENT & PLAN NOTE
Vitamin B12 levels actually elevated, can hold vitamins for time  Orders:    Follow Up In Primary Care - Established    Follow Up In Primary Care - Established; Future    CBC and Auto Differential; Future    Vitamin B12; Future

## 2025-05-30 NOTE — ASSESSMENT & PLAN NOTE
Stable with current medication  Orders:    Follow Up In Primary Care - Established    sertraline (Zoloft) 100 mg tablet; Take 1 tablet (100 mg) by mouth once daily.    Follow Up In Primary Care - Established; Future

## 2025-05-30 NOTE — PROGRESS NOTES
Subjective   Reason for Visit: Alma Delia Walsh is an 84 y.o. female here for a Medicare Wellness visit.     Past Medical, Surgical, and Family History reviewed and updated in chart.    Reviewed all medications by prescribing practitioner or clinical pharmacist (such as prescriptions, OTCs, herbal therapies and supplements) and documented in the medical record.    HPI  No headache, chest pain, shortness of breath, dizziness, lightheadedness, or edema  No low blood sugars since last OV, seen opthalmology in the past year, and no numbness or tingling in feet, skin normal.  Follows with [facility]ology at [facility], last seen in March  Had L THR, seen ortho last in March  Seen urology in January for bladder  Had some palpitations this AM, lasted a couple of hours, chest tight  Using a cane, no LH/dizzy or falling  + constipated at times, uses a stool softner, uses Metamucil  Sleep OK, uses 1/2 trazodone at night with help  + urine frequency, medicine helped at first  No gout flares    Patient Care Team:  Duc Ramirez MD as PCP - General (Family Medicine)  [facility]uc Ramirez [facility] PCP [facility]     Review of Systems   Constitutional:  Negative for activity change, appetite change, fatigue and unexpected weight change.   HENT:  Negative for ear pain, nosebleeds, rhinorrhea, sneezing and trouble swallowing.    Respiratory:  Negative for cough, shortness of breath and wheezing.    Cardiovascular:  Positive for palpitations. Negative for chest pain and leg swelling.   Gastrointestinal:  Negative for abdominal distention, abdominal pain, constipation, diarrhea, nausea and vomiting.   Genitourinary:  Positive for frequency. Negative for difficulty urinating.   Musculoskeletal:  Positive for arthralgias and gait problem.   Skin:  Negative for rash.   Neurological:  Negative for dizziness, light-headedness, numbness and headaches.   Hematological:  Negative for adenopathy.   Psychiatric/Behavioral:   "Positive for dysphoric mood. Negative for behavioral problems and sleep disturbance. The patient is not nervous/anxious.    All other systems reviewed and are negative.      Objective   Vitals:  /80   Pulse 68   Ht 1.575 m (5' 2\")   Wt 68.4 kg (150 lb 12.8 oz)   SpO2 97%   BMI 27.58 kg/m²       Physical Exam  Vitals and nursing note reviewed.   Constitutional:       General: She is not in acute distress.     Appearance: Normal appearance. She is not toxic-appearing.   HENT:      Head: Normocephalic and atraumatic.      Right Ear: Tympanic membrane, ear canal and external ear normal.      Left Ear: Tympanic membrane, ear canal and external ear normal.      Nose: Nose normal.      Mouth/Throat:      Mouth: Mucous membranes are moist.      Pharynx: Oropharynx is clear.   Eyes:      Extraocular Movements: Extraocular movements intact.      Conjunctiva/sclera: Conjunctivae normal.      Pupils: Pupils are equal, round, and reactive to light.   Cardiovascular:      Rate and Rhythm: Normal rate and regular rhythm.      Pulses: Normal pulses.      Heart sounds: Normal heart sounds.   Pulmonary:      Effort: Pulmonary effort is normal.      Breath sounds: Normal breath sounds.   Musculoskeletal:      Cervical back: Normal range of motion and neck supple.   Skin:     General: Skin is warm and dry.      Capillary Refill: Capillary refill takes less than 2 seconds.   Neurological:      General: No focal deficit present.      Mental Status: She is alert and oriented to person, place, and time. Mental status is at baseline.   Psychiatric:         Mood and Affect: Mood normal.         Behavior: Behavior normal.         Assessment & Plan  Coronary artery disease involving autologous artery coronary bypass graft without angina pectoris  Seems to be stable currently with current medications, having some intermittent palpitations, advised about Valsalva maneuvers, could also take an extra half metoprolol if needed, recommend " follow-up with cardiology.  Orders:    Follow Up In Primary Care [facility]    Follow Up In Primary Care [facility]; Future    Primary hypertension  Blood pressure stable renal function stable, no change.  Orders:    Follow Up In Primary Care [facility]    amLODIPine (Norvasc) 5 mg tablet; Take 1 tablet (5 mg) by mouth once daily.    Follow Up In Primary Care [facility]; Future    Comprehensive Metabolic Panel; Future    Type 2 diabetes mellitus without complication, without long-term current use of insulin  A1c testing stable, continue with current medication  Orders:    Follow Up In Primary Care [facility]    Follow Up In Primary Care - Established; Future    Cholesterol, LDL Direct; Future    Comprehensive Metabolic Panel; Future    Hemoglobin A1C; Future    Vitamin B12 deficiency  Vitamin B12 levels actually elevated, can hold vitamins for time  Orders:    Follow Up In Primary Care - Established    Follow Up In Primary Care - Established; Future    CBC and Auto Differential; Future    Vitamin B12; Future    Mild episode of recurrent major depressive disorder  Stable with current medication  Orders:    Follow Up In Primary Care - Established    sertraline (Zoloft) 100 mg tablet; Take 1 tablet (100 mg) by mouth once daily.    Follow Up In Primary Care [facility]; Future    Mixed hyperlipidemia  Laboratory testing stable, continue with the atorvastatin  Orders:    Follow Up In [facility]    Follow Up In Primary Care - Established; Future    Cholesterol, LDL Direct; Future    Comprehensive Metabolic Panel; Future    Rheumatoid arthritis, involving unspecified site, unspecified whether rheumatoid factor present (Multi)  Uses a walker or cane, pain is stable.  Orders:    Follow Up In Primary Care - Established    Follow Up In Primary Care - Established; Future    Palpitations  Some increase frequency of palpitations, advised about Valsalva maneuver, could take extra metoprolol if  needed.  Orders:    Follow Up In Primary Care - [facility]    Follow Up In Primary Care [facility]; Future    Primary insomnia  Continue with trazodone at night  Orders:    Follow Up In Primary Care - [facility]    Follow Up In Primary Care [facility]; Future    Urge incontinence of urine  Not much help with Myrbetriq, did see urology.  Orders:    Follow Up In Primary Care - [facility]    Follow Up In [facility] Care [facility]; Future    Routine general medical examination at health care facility    Orders:    Follow Up In Primary Care [facility]    Follow Up In Primary Care - Established; Future    Chronic gout due to renal impairment without tophus, unspecified site  Uric acid level actually increased but no gout flares in some time.  Orders:    Follow Up In Primary Care [facility]    Follow Up In Primary Care - Established; Future    PAF (paroxysmal atrial fibrillation) (Multi)  Continue with apixaban at low-dose.  Is having some intermittent palpitations, advised about Valsalva maneuvers         DM type 2 with diabetic mixed hyperlipidemia (Multi)  A1c testing stable, no change         Chronic kidney disease, stage 3a (Multi)  Renal function stable, blood pressure under good control

## 2025-05-30 NOTE — ASSESSMENT & PLAN NOTE
Seems to be stable currently with current medications, having some intermittent palpitations, advised about Valsalva maneuvers, could also take an extra half metoprolol if needed, recommend follow-up with cardiology.  Orders:    Follow Up In Primary Care - Established    Follow Up In Primary Care - Established; Future

## 2025-05-30 NOTE — ASSESSMENT & PLAN NOTE
Continue with apixaban at low-dose.  Is having some intermittent palpitations, advised about Valsalva maneuvers

## 2025-05-30 NOTE — ASSESSMENT & PLAN NOTE
Not much help with Myrbetriq, did see urology.  Orders:    Follow Up In Primary Care - Established    Follow Up In Primary Care - Established; Future

## 2025-05-30 NOTE — ASSESSMENT & PLAN NOTE
Laboratory testing stable, continue with the atorvastatin  Orders:    Follow Up In Primary Care - Established    Follow Up In Primary Care - Established; Future    Cholesterol, LDL Direct; Future    Comprehensive Metabolic Panel; Future

## 2025-05-30 NOTE — ASSESSMENT & PLAN NOTE
Blood pressure stable renal function stable, no change.  Orders:    Follow Up In Primary Care - Established    amLODIPine (Norvasc) 5 mg tablet; Take 1 tablet (5 mg) by mouth once daily.    Follow Up In Primary Care - Established; Future    Comprehensive Metabolic Panel; Future

## 2025-05-30 NOTE — ASSESSMENT & PLAN NOTE
Uric acid level actually increased but no gout flares in some time.  Orders:    Follow Up In Primary Care - Established    Follow Up In Primary Care - Established; Future

## 2025-12-01 ENCOUNTER — APPOINTMENT (OUTPATIENT)
Age: 85
End: 2025-12-01
Payer: MEDICARE